# Patient Record
Sex: FEMALE | Race: WHITE | Employment: FULL TIME | URBAN - NONMETROPOLITAN AREA
[De-identification: names, ages, dates, MRNs, and addresses within clinical notes are randomized per-mention and may not be internally consistent; named-entity substitution may affect disease eponyms.]

---

## 2019-05-14 ENCOUNTER — OFFICE VISIT (OUTPATIENT)
Dept: PRIMARY CARE CLINIC | Age: 24
End: 2019-05-14
Payer: COMMERCIAL

## 2019-05-14 VITALS
BODY MASS INDEX: 27.29 KG/M2 | OXYGEN SATURATION: 98 % | DIASTOLIC BLOOD PRESSURE: 82 MMHG | TEMPERATURE: 98.5 F | WEIGHT: 139 LBS | HEART RATE: 112 BPM | SYSTOLIC BLOOD PRESSURE: 110 MMHG | HEIGHT: 60 IN

## 2019-05-14 DIAGNOSIS — K59.00 CONSTIPATION, UNSPECIFIED CONSTIPATION TYPE: ICD-10-CM

## 2019-05-14 DIAGNOSIS — R52 BODY ACHES: Primary | ICD-10-CM

## 2019-05-14 DIAGNOSIS — R68.83 CHILLS: ICD-10-CM

## 2019-05-14 DIAGNOSIS — M25.50 ARTHRALGIA, UNSPECIFIED JOINT: ICD-10-CM

## 2019-05-14 LAB — S PYO AG THROAT QL: NORMAL

## 2019-05-14 PROCEDURE — 99203 OFFICE O/P NEW LOW 30 MIN: CPT | Performed by: NURSE PRACTITIONER

## 2019-05-14 PROCEDURE — 87880 STREP A ASSAY W/OPTIC: CPT | Performed by: NURSE PRACTITIONER

## 2019-05-14 SDOH — HEALTH STABILITY: MENTAL HEALTH: HOW OFTEN DO YOU HAVE A DRINK CONTAINING ALCOHOL?: MONTHLY OR LESS

## 2019-05-14 ASSESSMENT — PATIENT HEALTH QUESTIONNAIRE - PHQ9
1. LITTLE INTEREST OR PLEASURE IN DOING THINGS: 0
SUM OF ALL RESPONSES TO PHQ9 QUESTIONS 1 & 2: 0
2. FEELING DOWN, DEPRESSED OR HOPELESS: 0
SUM OF ALL RESPONSES TO PHQ QUESTIONS 1-9: 0
SUM OF ALL RESPONSES TO PHQ QUESTIONS 1-9: 0

## 2019-05-14 ASSESSMENT — ENCOUNTER SYMPTOMS
BLOOD IN STOOL: 0
DIARRHEA: 0
CONSTIPATION: 1
COUGH: 0
ABDOMINAL PAIN: 0
EYE DISCHARGE: 0
WHEEZING: 0
EYE REDNESS: 0
RHINORRHEA: 0
TROUBLE SWALLOWING: 0
SORE THROAT: 0

## 2019-05-14 NOTE — PROGRESS NOTES
Jean [address]  [place], [address] [place] [address] Rd  Phone [phone]   Fax [phone]      OFFICE VISIT: 5/14/2019    Arnel Goodwin is a 20 y.o. female whopresents today for her medical conditions/complaints as noted below. Arnel Goodwin isc/o of Establish Care; Joint Pain; Muscle Pain (skin hurts to touch it- this has happened before but usually only last a few hours - today it has continued and also making muscles and joints hurt); and Health Maintenance (Pap Smear - has not had one since 2017 when her child was born)        HPI:      HPI  Establish Care; Joint Pain; Muscle Pain (skin hurts to touch it- this has happened before but usually only last a few hours - today it has continued and also making muscles and joints hurt); started this morning. Has felt warm and chilled. Hasn't checked her temp. Denies  symptoms. Denies URI symptoms. Has had some constipation the last few days. States she ate [employer] last night and symptoms started after that. Has some nausea, but no vomiting. No rash  No joint swelling or erythema  Denies tick bite  Denies cat scratch      Health Maintenance (Pap Smear - has not had one since 2017 when her child was born)    History reviewed. No pertinent past medical history. Past Surgical History:   Procedure Laterality Date    TUBAL LIGATION      TYMPANOSTOMY TUBE PLACEMENT Bilateral        Family History   Problem Relation Age of Onset    Cancer Mother     Mental Illness Mother     Stroke Paternal Grandmother     Other Paternal Grandfather        Social History     Tobacco Use    Smoking status: Current Every Day Smoker     Packs/day: 0.50     Years: 8.00     Pack years: 4.00     Types: Cigarettes     Start date: 2011    Smokeless tobacco: Never Used   Substance Use Topics    Alcohol use: Yes     Frequency: Monthly or less      No current outpatient medications on file. No current facility-administered medications for this visit.       No Known Allergies Health Maintenance   Topic Date Due    Varicella Vaccine (1 of 2 - 13+ 2-dose series) 06/30/2006    HPV vaccine (1 - Female 3-dose series) 06/30/2008    HIV screen  06/30/2008    DTaP/Tdap/Td vaccine (1 - Tdap) 06/30/2012    Cervical cancer screen  06/30/2014    Flu vaccine (Season Ended) 09/01/2019    Pneumococcal 0-64 years Vaccine  Aged Out        Subjective:     Review of Systems   Constitutional: Negative for appetite change and unexpected weight change. HENT: Negative for congestion, rhinorrhea, sore throat and trouble swallowing. Eyes: Negative for discharge and redness. Respiratory: Negative for cough and wheezing. Cardiovascular: Negative for chest pain. Gastrointestinal: Positive for constipation. Negative for abdominal pain, blood in stool and diarrhea. Genitourinary: Negative for dysuria. Musculoskeletal: Positive for arthralgias and myalgias. Skin: Negative for rash. Neurological: Negative for weakness. Hematological: Negative for adenopathy. Objective:      Physical Exam   Constitutional: She is oriented to person, place, and time. She appears well-developed and well-nourished. HENT:   Head: Normocephalic. Eyes: Conjunctivae are normal.   Neck: Normal range of motion. Neck supple. Cardiovascular: Normal rate and regular rhythm. Pulmonary/Chest: Effort normal.   Abdominal: Soft. Musculoskeletal: Normal range of motion. Lumbar back: She exhibits tenderness. Neurological: She is alert and oriented to person, place, and time. Skin: Skin is warm and dry. Psychiatric: Her behavior is normal.   Vitals reviewed. /82   Pulse 112   Temp 98.5 °F (36.9 °C) (Temporal)   Ht 5' (1.524 m)   Wt 139 lb (63 kg)   LMP 04/18/2019   SpO2 98%   BMI 27.15 kg/m²     Assessment:           ICD-10-CM    1. Body aches R52 POCT rapid strep A   2. Constipation, unspecified constipation type K59.00    3. Arthralgia, unspecified joint M25.50    4.  Chills

## 2019-05-14 NOTE — LETTER
[address] [address]  Phone: [phone]  Fax: [phone] DON Baca        May 14, 2019     Patient: Laura Becerra   YOB: 1995   Date of Visit: 5/14/2019       To Whom it May Concern:    Laura Becerra was seen in my clinic on 5/14/2019. She may return to work on 5/16/2019. If you have any questions or concerns, please don't hesitate to call.     Sincerely,         DON Clarke

## 2019-07-12 ENCOUNTER — OFFICE VISIT (OUTPATIENT)
Dept: PRIMARY CARE CLINIC | Age: 24
End: 2019-07-12
Payer: COMMERCIAL

## 2019-07-12 VITALS
HEART RATE: 80 BPM | SYSTOLIC BLOOD PRESSURE: 116 MMHG | HEIGHT: 60 IN | OXYGEN SATURATION: 98 % | TEMPERATURE: 98.5 F | BODY MASS INDEX: 27.96 KG/M2 | WEIGHT: 142.4 LBS | DIASTOLIC BLOOD PRESSURE: 68 MMHG

## 2019-07-12 DIAGNOSIS — Q82.9 KERATOSIS PILARIS, CONGENITAL: ICD-10-CM

## 2019-07-12 DIAGNOSIS — Z00.00 VISIT FOR PREVENTIVE HEALTH EXAMINATION: Primary | ICD-10-CM

## 2019-07-12 DIAGNOSIS — F17.219 CIGARETTE NICOTINE DEPENDENCE WITH NICOTINE-INDUCED DISORDER: ICD-10-CM

## 2019-07-12 DIAGNOSIS — K59.04 CHRONIC IDIOPATHIC CONSTIPATION: ICD-10-CM

## 2019-07-12 DIAGNOSIS — E55.9 VITAMIN D DEFICIENCY: ICD-10-CM

## 2019-07-12 PROCEDURE — 99395 PREV VISIT EST AGE 18-39: CPT | Performed by: PEDIATRICS

## 2019-07-12 ASSESSMENT — ENCOUNTER SYMPTOMS
SINUS PRESSURE: 0
DIARRHEA: 1
BLOOD IN STOOL: 0
SORE THROAT: 0
WHEEZING: 0
RHINORRHEA: 0
ANAL BLEEDING: 1
ALLERGIC/IMMUNOLOGIC NEGATIVE: 1
SHORTNESS OF BREATH: 0
PHOTOPHOBIA: 0
CONSTIPATION: 1
EYE REDNESS: 0
NAUSEA: 0
ABDOMINAL PAIN: 1
VOICE CHANGE: 0
TROUBLE SWALLOWING: 0
ABDOMINAL DISTENTION: 1
EYE PAIN: 0
VOMITING: 0
EYES NEGATIVE: 1
EYE DISCHARGE: 0
COUGH: 0
EYE ITCHING: 0

## 2019-07-19 ENCOUNTER — TELEPHONE (OUTPATIENT)
Dept: PRIMARY CARE CLINIC | Age: 24
End: 2019-07-19

## 2019-07-19 DIAGNOSIS — Z00.00 VISIT FOR PREVENTIVE HEALTH EXAMINATION: ICD-10-CM

## 2019-07-19 LAB
ALBUMIN SERPL-MCNC: 4.5 G/DL (ref 3.5–5.2)
ALP BLD-CCNC: 49 U/L (ref 35–104)
ALT SERPL-CCNC: 11 U/L (ref 5–33)
ANION GAP SERPL CALCULATED.3IONS-SCNC: 18 MMOL/L (ref 7–19)
AST SERPL-CCNC: 13 U/L (ref 5–32)
BASOPHILS ABSOLUTE: 0 K/UL (ref 0–0.2)
BASOPHILS RELATIVE PERCENT: 0.7 % (ref 0–1)
BILIRUB SERPL-MCNC: 0.4 MG/DL (ref 0.2–1.2)
BUN BLDV-MCNC: 16 MG/DL (ref 6–20)
CALCIUM SERPL-MCNC: 8.9 MG/DL (ref 8.6–10)
CHLORIDE BLD-SCNC: 103 MMOL/L (ref 98–111)
CHOLESTEROL, TOTAL: 176 MG/DL (ref 160–199)
CO2: 20 MMOL/L (ref 22–29)
CREAT SERPL-MCNC: 0.7 MG/DL (ref 0.5–0.9)
CREATININE URINE: 174.4 MG/DL (ref 4.2–622)
EOSINOPHILS ABSOLUTE: 0.2 K/UL (ref 0–0.6)
EOSINOPHILS RELATIVE PERCENT: 3.1 % (ref 0–5)
GFR NON-AFRICAN AMERICAN: >60
GLUCOSE BLD-MCNC: 92 MG/DL (ref 74–109)
HBA1C MFR BLD: 5 % (ref 4–6)
HCT VFR BLD CALC: 41.4 % (ref 37–47)
HDLC SERPL-MCNC: 51 MG/DL (ref 65–121)
HEMOGLOBIN: 13.3 G/DL (ref 12–16)
LDL CHOLESTEROL CALCULATED: 109 MG/DL
LYMPHOCYTES ABSOLUTE: 2 K/UL (ref 1.1–4.5)
LYMPHOCYTES RELATIVE PERCENT: 34.1 % (ref 20–40)
MCH RBC QN AUTO: 32 PG (ref 27–31)
MCHC RBC AUTO-ENTMCNC: 32.1 G/DL (ref 33–37)
MCV RBC AUTO: 99.8 FL (ref 81–99)
MICROALBUMIN UR-MCNC: <1.2 MG/DL (ref 0–19)
MICROALBUMIN/CREAT UR-RTO: NORMAL MG/G
MONOCYTES ABSOLUTE: 0.6 K/UL (ref 0–0.9)
MONOCYTES RELATIVE PERCENT: 10.4 % (ref 0–10)
NEUTROPHILS ABSOLUTE: 3 K/UL (ref 1.5–7.5)
NEUTROPHILS RELATIVE PERCENT: 51.4 % (ref 50–65)
PDW BLD-RTO: 11.9 % (ref 11.5–14.5)
PLATELET # BLD: 269 K/UL (ref 130–400)
PMV BLD AUTO: 10.7 FL (ref 9.4–12.3)
POTASSIUM SERPL-SCNC: 4.3 MMOL/L (ref 3.5–5)
RBC # BLD: 4.15 M/UL (ref 4.2–5.4)
SODIUM BLD-SCNC: 141 MMOL/L (ref 136–145)
T4 FREE: 1.1 NG/DL (ref 0.9–1.7)
TOTAL PROTEIN: 7.6 G/DL (ref 6.6–8.7)
TRIGL SERPL-MCNC: 78 MG/DL (ref 0–149)
TSH SERPL DL<=0.05 MIU/L-ACNC: 1.53 UIU/ML (ref 0.27–4.2)
VITAMIN D 25-HYDROXY: 22.3 NG/ML
WBC # BLD: 5.8 K/UL (ref 4.8–10.8)

## 2019-10-18 ENCOUNTER — OFFICE VISIT (OUTPATIENT)
Dept: PRIMARY CARE CLINIC | Age: 24
End: 2019-10-18
Payer: COMMERCIAL

## 2019-10-18 ENCOUNTER — HOSPITAL ENCOUNTER (OUTPATIENT)
Dept: GENERAL RADIOLOGY | Age: 24
Discharge: HOME | End: 2019-10-18
Payer: COMMERCIAL

## 2019-10-18 ENCOUNTER — TELEPHONE (OUTPATIENT)
Dept: PRIMARY CARE CLINIC | Age: 24
End: 2019-10-18

## 2019-10-18 VITALS
OXYGEN SATURATION: 99 % | WEIGHT: 145 LBS | HEART RATE: 85 BPM | HEIGHT: 60 IN | BODY MASS INDEX: 28.47 KG/M2 | SYSTOLIC BLOOD PRESSURE: 126 MMHG | DIASTOLIC BLOOD PRESSURE: 80 MMHG | TEMPERATURE: 99 F

## 2019-10-18 DIAGNOSIS — M25.561 ACUTE PAIN OF RIGHT KNEE: Primary | ICD-10-CM

## 2019-10-18 DIAGNOSIS — M25.561 ACUTE PAIN OF RIGHT KNEE: ICD-10-CM

## 2019-10-18 DIAGNOSIS — M54.31 SCIATICA OF RIGHT SIDE: ICD-10-CM

## 2019-10-18 PROCEDURE — 73562 X-RAY EXAM OF KNEE 3: CPT

## 2019-10-18 PROCEDURE — 99213 OFFICE O/P EST LOW 20 MIN: CPT | Performed by: NURSE PRACTITIONER

## 2019-10-18 RX ORDER — METHYLPREDNISOLONE 4 MG/1
TABLET ORAL
Qty: 1 KIT | Refills: 0 | Status: SHIPPED | OUTPATIENT
Start: 2019-10-18 | End: 2019-10-24

## 2019-10-18 ASSESSMENT — ENCOUNTER SYMPTOMS
RESPIRATORY NEGATIVE: 1
GASTROINTESTINAL NEGATIVE: 1

## 2020-05-12 ENCOUNTER — VIRTUAL VISIT (OUTPATIENT)
Dept: PRIMARY CARE CLINIC | Age: 25
End: 2020-05-12
Payer: COMMERCIAL

## 2020-05-12 ENCOUNTER — NURSE TRIAGE (OUTPATIENT)
Dept: OTHER | Facility: CLINIC | Age: 25
End: 2020-05-12

## 2020-05-12 PROCEDURE — 99213 OFFICE O/P EST LOW 20 MIN: CPT | Performed by: NURSE PRACTITIONER

## 2020-05-12 RX ORDER — HYDROXYZINE PAMOATE 25 MG/1
25 CAPSULE ORAL 3 TIMES DAILY PRN
Qty: 30 CAPSULE | Refills: 0 | Status: SHIPPED | OUTPATIENT
Start: 2020-05-12 | End: 2020-06-26

## 2020-05-12 RX ORDER — BUSPIRONE HYDROCHLORIDE 5 MG/1
5 TABLET ORAL 2 TIMES DAILY
Qty: 60 TABLET | Refills: 0 | Status: SHIPPED | OUTPATIENT
Start: 2020-05-12 | End: 2020-05-22 | Stop reason: SDUPTHER

## 2020-05-12 RX ORDER — SERTRALINE HYDROCHLORIDE 25 MG/1
25 TABLET, FILM COATED ORAL DAILY
Qty: 30 TABLET | Refills: 3 | Status: SHIPPED | OUTPATIENT
Start: 2020-05-12 | End: 2020-05-22 | Stop reason: SDUPTHER

## 2020-05-12 ASSESSMENT — ENCOUNTER SYMPTOMS: TROUBLE SWALLOWING: 0

## 2020-05-12 NOTE — TELEPHONE ENCOUNTER
Received call from Darlene in [facility]. Patient is having anxiety or whatever- in her words. She needs something to calm her down so she can go to work- in her words. She went to [place] on Saturday and she is audibly upset about people not following wearing masks rules and social distancing. SOB with anxiety    Patient is supposed to return to work today but called in sick. Protocol recommendation shared and care advice shared. Call soft transferred to Samantha Tadeo in [facility] to schedule appointment to schedule a video chat. .    Please do not reply to the triage nurse through this encounter. Any subsequent communication should be directly with the patient.     Reason for Disposition   Patient sounds very upset or troubled to the triager    Protocols used: ANXIETY AND PANIC ATTACK-ADULT-OH

## 2020-05-12 NOTE — PROGRESS NOTES
Jean 23  [place], [address]  Phone [phone]   Fax [phone]            TELEMEDICINE visit by doxy me    OFFICE VISIT: 2020    Mony Kidd- : 1995      Reason For Visit:  Jeff Webb is a 30 y.o. Mac Douglas is here for Anxiety (and panic attacks)         Health Maintenance         HPI     Patient is for issue of anxiety and panic attacks  Reports Saturday went to 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 There is no height or weight on file to calculate BMI.     Results for orders placed or performed in visit on 19   CBC Auto Differential   Result Value Ref Range    WBC 5.8 4.8 - 10.8 K/uL    RBC 4.15 (L) 4.20 - 5.40 M/uL    Hemoglobin 13.3 12.0 - 16.0 g/dL    Hematocrit 41.4 37.0 - 47.0 %    MCV 99.8 (H) 81.0 - 99.0 fL    MCH 32.0 (H) 27.0 - 31.0 pg    MCHC 32.1 (L) 33.0 - 37.0 g/dL    RDW 11.9 11.5 - 14.5 %    Platelets 914 061 - 899 K/uL    MPV 10.7 9.4 - 12.3 fL    Neutrophils % 51.4 50.0 - 65.0 %    Lymphocytes % 34.1 20.0 - 40.0 %    Monocytes % 10.4 (H) 0.0 - 10.0 %    Eosinophils % 3.1 0.0 - 5.0 %    Basophils % 0.7 0.0 - 1.0 %    Neutrophils Absolute 3.0 1.5 - 7.5 K/uL    Lymphocytes Absolute 2.0 1.1 - 4.5 K/uL    Monocytes Absolute 0.60 0.00 - 0.90 K/uL    Eosinophils Absolute 0.20 0.00 - 0.60 K/uL    Basophils Absolute 0.00 0.00 - 0.20 K/uL   Comprehensive Metabolic Panel   Result Value Ref Range    Sodium 141 136 - 145 mmol/L    Potassium 4.3 3.5 - 5.0 mmol/L    Chloride 103 98 - 111 mmol/L    CO2 20 (L) 22 - 29 mmol/L    Anion Gap 18 7 - 19 mmol/L    Glucose 92 74 - 109 mg/dL    BUN 16 6 - 20 mg/dL    CREATININE 0.7 0.5 - 0.9 mg/dL    GFR Non-African American >60 >60    Calcium 8.9  CREATININE 07/19/2019 0.7     GFR Non- 07/19/2019 >60     Calcium 07/19/2019 8.9     Total Protein 07/19/2019 7.6     Alb 07/19/2019 4.5     Total Bilirubin 07/19/2019 0.4     Alkaline Phosphatase 07/19/2019 49     ALT 07/19/2019 11     AST 07/19/2019 13     Microalbumin, Random Uri* 07/19/2019 <1.20     Creatinine, Ur 07/19/2019 174.4     Microalbumin Creatinine * 07/19/2019 see below     T4 Free 07/19/2019 1.1     TSH 07/19/2019 1.530     Cholesterol, Total 07/19/2019 176     Triglycerides 07/19/2019 78     HDL 07/19/2019 51*    LDL Calculated 07/19/2019 109     Hemoglobin A1C 07/19/2019 5.0     Vit D, 25-Hydroxy 07/19/2019 22.3*     Copies of these are in the chart. Prior to Visit Medications    Medication Sig Taking? Authorizing Provider   hydrOXYzine (VISTARIL) 25 MG capsule Take 1 capsule by mouth 3 times daily as needed for Anxiety Yes Rosanky Favorite, APRN   sertraline (ZOLOFT) 25 MG tablet Take 1 tablet by mouth daily Yes Rosanky Favorite, APRN   busPIRone (BUSPAR) 5 MG tablet Take 1 tablet by mouth 2 times daily Yes Rosanky Favorite, APRN   linaclotide [place]) 145 MCG capsule Take 1 capsule by mouth every morning (before breakfast)  B Kayden Mock DO   nicotine polacrilex (NICORETTE) 2 MG gum Take 1 each by mouth as needed for Smoking cessation  B Kayden Mock DO       Allergies: Patient has no known allergies. No past medical history on file. Past Surgical History:   Procedure Laterality Date    TUBAL LIGATION      TYMPANOSTOMY TUBE PLACEMENT Bilateral        Social History     Tobacco Use    Smoking status: Current Every Day Smoker     Packs/day: 0.50     Years: 8.00     Pack years: 4.00     Types: Cigarettes     Start date: 2011    Smokeless tobacco: Never Used   Substance Use Topics    Alcohol use: Yes     Frequency: Monthly or less       Review of Systems   Constitutional: Positive for activity change.  Negative for appetite change, fatigue and fever. HENT: Negative for congestion, postnasal drip and trouble swallowing. Genitourinary: Negative for difficulty urinating. Skin: Negative for rash. Neurological: Negative for dizziness. Psychiatric/Behavioral: Positive for agitation (with ocd things) and sleep disturbance. Negative for self-injury and suicidal ideas. The patient is nervous/anxious. The patient is not hyperactive. Physical Exam  Constitutional:       Appearance: Normal appearance. Comments: Video     Cardiovascular:      Rate and Rhythm: Normal rate and regular rhythm. Heart sounds: No murmur. Neurological:      Mental Status: She is alert and oriented to person, place, and time. Psychiatric:         Mood and Affect: Mood normal.         Behavior: Behavior normal.      Comments: Calm on video           ASSESSMENT/ Maya Niki is  being evaluated by a Virtual Visit (video visit) encounter to address concerns as mentioned above. A caregiver was present when appropriate. Due to this being a TeleHealth encounter (During [facility]-18 public health emergency), evaluation of the following organ systems was limited: Vitals/Constitutional/EENT/Resp/CV/GI//MS/Neuro/Skin/Heme-Lymph-Imm. Pursuant to the emergency declaration under the [address] [place] [address] authority and the [employer] Act, this Virtual Visit was conducted with patient's (and/or legal guardian's) consent, to reduce the patient's risk of exposure to COVID-19 and provide necessary medical care. The patient (and/or legal guardian) has also been advised to contact this office for worsening conditions or problems, and seek emergency medical treatment and/or call 911 if deemed necessary. Services were provided through a video synchronous discussion virtually to substitute for in-person clinic visit.  Patient and provider were located at their

## 2020-05-22 ENCOUNTER — VIRTUAL VISIT (OUTPATIENT)
Dept: PRIMARY CARE CLINIC | Age: 25
End: 2020-05-22
Payer: COMMERCIAL

## 2020-05-22 PROCEDURE — 99213 OFFICE O/P EST LOW 20 MIN: CPT | Performed by: NURSE PRACTITIONER

## 2020-05-22 RX ORDER — BUSPIRONE HYDROCHLORIDE 5 MG/1
5 TABLET ORAL 2 TIMES DAILY
Qty: 60 TABLET | Refills: 0 | Status: SHIPPED | OUTPATIENT
Start: 2020-05-22 | End: 2020-06-21

## 2020-05-22 ASSESSMENT — ENCOUNTER SYMPTOMS
COUGH: 0
BACK PAIN: 0
SHORTNESS OF BREATH: 0
WHEEZING: 0
SORE THROAT: 0
TROUBLE SWALLOWING: 0
EYES NEGATIVE: 1

## 2020-05-22 NOTE — PATIENT INSTRUCTIONS
Patient Education        Anxiety Disorder: Care Instructions  Your Care Instructions    Anxiety is a normal reaction to stress. Difficult situations can cause you to have symptoms such as sweaty palms and a nervous feeling. In an anxiety disorder, the symptoms are far more severe. Constant worry, muscle tension, trouble sleeping, nausea and diarrhea, and other symptoms can make normal daily activities difficult or impossible. These symptoms may occur for no reason, and they can affect your work, school, or social life. Medicines, counseling, and self-care can all help. Follow-up care is a key part of your treatment and safety. Be sure to make and go to all appointments, and call your doctor if you are having problems. It's also a good idea to know your test results and keep a list of the medicines you take. How can you care for yourself at home? · Take medicines exactly as directed. Call your doctor if you think you are having a problem with your medicine. · Go to your counseling sessions and follow-up appointments. · Recognize and accept your anxiety. Then, when you are in a situation that makes you anxious, say to yourself, \"This is not an emergency. I feel uncomfortable, but I am not in danger. I can keep going even if I feel anxious. \"  · Be kind to your body:  ? Relieve tension with exercise or a massage. ? Get enough rest.  ? Avoid alcohol, caffeine, nicotine, and illegal drugs. They can increase your anxiety level and cause sleep problems. ? Learn and do relaxation techniques. See below for more about these techniques. · Engage your mind. Get out and do something you enjoy. Go to a funny movie, or take a walk or hike. Plan your day. Having too much or too little to do can make you anxious. · Keep a record of your symptoms. Discuss your fears with a good friend or family member, or join a support group for people with similar problems. Talking to others sometimes relieves stress.   · Get involved in play a relaxation tape while you drive a car. When should you call for help? Call 911 anytime you think you may need emergency care. For example, call if:    · You feel you cannot stop from hurting yourself or someone else.   Ariella Lawrence the numbers for these national suicide hotlines: 7-003-187-TALK (5-685.318.8522) and 6-959-OWBAPCZ (6-640.290.9389). If you or someone you know talks about suicide or feeling hopeless, get help right away.   Watch closely for changes in your health, and be sure to contact your doctor if:    · You have anxiety or fear that affects your life.     · You have symptoms of anxiety that are new or different from those you had before. Where can you learn more? Go to [email or web address] and sign in to your [employer] account. Enter P754 in the [employer] box to learn more about \"Anxiety Disorder: Care Instructions. \"     If you do not have an account, please click on the \"Sign Up Now\" link. Current as of: May 28, 2019Content Version: 12.4  © 3655-9261 Healthwise, Incorporated. Care instructions adapted under license by [facility] If you have questions about a medical condition or this instruction, always ask your healthcare professional. [address] any warranty or liability for your use of this information.

## 2020-05-22 NOTE — PROGRESS NOTES
Jean [address]  [place], [address]  Phone [phone]   Fax [phone]            TELEMEDICINE visit by doxy me     OFFICE VISIT: 2020    David Souza- : 1995      Reason For Visit:  JADE is a 30 y.o. Camila Lan is here for Follow-up (panic follow up)         Health Maintenance    HPI      Patient is 1 week follow up on anxiety and panic attacks   OCD and just gets upset    She has been working  Still some panic at work  But goes outside takes mask off and breath and go back to work    She has started the zoloft  Denies any side effects    She is taking the buspar as needed  She took [employer] and able to get through   She has been taking it daily and then the second if needed         vitals were not taken for this visit. There is no height or weight on file to calculate BMI.     Results for orders placed or performed in visit on 19   CBC Auto Differential   Result Value Ref Range    WBC 5.8 4.8 - 10.8 K/uL    RBC 4.15 (L) 4.20 - 5.40 M/uL    Hemoglobin 13.3 12.0 - 16.0 g/dL    Hematocrit 41.4 37.0 - 47.0 %    MCV 99.8 (H) 81.0 - 99.0 fL    MCH 32.0 (H) 27.0 - 31.0 pg    MCHC 32.1 (L) 33.0 - 37.0 g/dL    RDW 11.9 11.5 - 14.5 %    Platelets 998 008 - 594 K/uL    MPV 10.7 9.4 - 12.3 fL    Neutrophils % 51.4 50.0 - 65.0 %    Lymphocytes % 34.1 20.0 - 40.0 %    Monocytes % 10.4 (H) 0.0 - 10.0 %    Eosinophils % 3.1 0.0 - 5.0 %    Basophils % 0.7 0.0 - 1.0 %    Neutrophils Absolute 3.0 1.5 - 7.5 K/uL    Lymphocytes Absolute 2.0 1.1 - 4.5 K/uL    Monocytes Absolute 0.60 0.00 - 0.90 K/uL    Eosinophils Absolute 0.20 0.00 - 0.60 K/uL    Basophils Absolute 0.00 0.00 - 0.20 K/uL   Comprehensive Metabolic Panel   Result Value Ref Range    Sodium 141 136 - 145 mmol/L    Potassium 4.3 3.5 - 5.0 mmol/L    Chloride 103 98 - 111 mmol/L    CO2 20 (L) 22 - 29 mmol/L    Anion Gap 18 7 - 19 mmol/L    Glucose 92 74 - 109 mg/dL    BUN 16 6 - 20 mg/dL    CREATININE 0.7 0.5 - 0.9 mg/dL    GFR Non- 07/19/2019 92     BUN 07/19/2019 16     CREATININE 07/19/2019 0.7     GFR Non- 07/19/2019 >60     Calcium 07/19/2019 8.9     Total Protein 07/19/2019 7.6     Alb 07/19/2019 4.5     Total Bilirubin 07/19/2019 0.4     Alkaline Phosphatase 07/19/2019 49     ALT 07/19/2019 11     AST 07/19/2019 13     Microalbumin, Random Uri* 07/19/2019 <1.20     Creatinine, Ur 07/19/2019 174.4     Microalbumin Creatinine * 07/19/2019 see below     T4 Free 07/19/2019 1.1     TSH 07/19/2019 1.530     Cholesterol, Total 07/19/2019 176     Triglycerides 07/19/2019 78     HDL 07/19/2019 51*    LDL Calculated 07/19/2019 109     Hemoglobin A1C 07/19/2019 5.0     Vit D, 25-Hydroxy 07/19/2019 22.3*     Copies of these are in the chart. Prior to Visit Medications    Medication Sig Taking? Authorizing Provider   sertraline (ZOLOFT) 50 MG tablet Take 1 tablet by mouth daily Yes DON Gonsalves   busPIRone (BUSPAR) 5 MG tablet Take 1 tablet by mouth 2 times daily Yes DON Gonsalves   hydrOXYzine (VISTARIL) 25 MG capsule Take 1 capsule by mouth 3 times daily as needed for Anxiety  DON Gonsalves   linaclotide Flonnie Rival) 145 MCG capsule Take 1 capsule by mouth every morning (before breakfast)  B Margaret Neves DO   nicotine polacrilex (NICORETTE) 2 MG gum Take 1 each by mouth as needed for Smoking cessation  B Margaret Neves DO       Allergies: Patient has no known allergies. No past medical history on file.     Past Surgical History:   Procedure Laterality Date    TUBAL LIGATION      TYMPANOSTOMY TUBE PLACEMENT Bilateral        Social History     Tobacco Use    Smoking status: Current Every Day Smoker     Packs/day: 0.50     Years: 8.00     Pack years: 4.00     Types: Cigarettes     Start date: 2011    Smokeless tobacco: Never Used   Substance Use Topics    Alcohol use: Yes     Frequency: Monthly or less       Review of Systems   Constitutional: Positive for activity change. Negative for appetite change, fatigue and fever. HENT: Negative for congestion, postnasal drip, sore throat and trouble swallowing. Eyes: Negative. Respiratory: Negative for cough, shortness of breath and wheezing. Cardiovascular: Negative for chest pain, palpitations and leg swelling. Musculoskeletal: Negative for arthralgias and back pain. Skin: Negative for rash. Psychiatric/Behavioral: Negative for agitation, self-injury, sleep disturbance and suicidal ideas. The patient is nervous/anxious (improving some). The patient is not hyperactive. Physical Exam  Constitutional:       Appearance: Normal appearance. Comments: Video     Neurological:      Mental Status: She is alert. ASSESSMENT/ Mainor Yandel is  being evaluated by a Virtual Visit (video visit) encounter to address concerns as mentioned above. A caregiver was present when appropriate. Due to this being a TeleHealth encounter (During [facility] public health emergency), evaluation of the following organ systems was limited: Vitals/Constitutional/EENT/Resp/CV/GI//MS/Neuro/Skin/Heme-Lymph-Imm. Pursuant to the emergency declaration under the [facility], [address] [place] [address] authority and the [employer] Act, this Virtual Visit was conducted with patient's (and/or legal guardian's) consent, to reduce the patient's risk of exposure to COVID-19 and provide necessary medical care. The patient (and/or legal guardian) has also been advised to contact this office for worsening conditions or problems, and seek emergency medical treatment and/or call 911 if deemed necessary. Services were provided through a video synchronous discussion virtually to substitute for in-person clinic visit. Patient and provider were located at their individual homes.      1. Panic disorder  Cont buspar daily and as needed  - sertraline (ZOLOFT) 50 MG

## 2020-06-26 ENCOUNTER — VIRTUAL VISIT (OUTPATIENT)
Dept: PRIMARY CARE CLINIC | Age: 25
End: 2020-06-26
Payer: COMMERCIAL

## 2020-06-26 PROCEDURE — 99442 PR PHYS/QHP TELEPHONE EVALUATION 11-20 MIN: CPT | Performed by: NURSE PRACTITIONER

## 2020-06-26 RX ORDER — PROPRANOLOL HYDROCHLORIDE 40 MG/1
40 TABLET ORAL 2 TIMES DAILY
Qty: 60 TABLET | Refills: 5 | Status: SHIPPED | OUTPATIENT
Start: 2020-06-26 | End: 2022-01-14

## 2020-06-26 RX ORDER — BUSPIRONE HYDROCHLORIDE 5 MG/1
5 TABLET ORAL 2 TIMES DAILY
Qty: 60 TABLET | Refills: 5 | Status: SHIPPED | OUTPATIENT
Start: 2020-06-26 | End: 2020-07-26

## 2020-06-26 RX ORDER — HYDROXYZINE PAMOATE 25 MG/1
25 CAPSULE ORAL 3 TIMES DAILY PRN
Qty: 30 CAPSULE | Refills: 5 | Status: SHIPPED | OUTPATIENT
Start: 2020-06-26 | End: 2022-01-14

## 2020-06-26 ASSESSMENT — PATIENT HEALTH QUESTIONNAIRE - PHQ9
SUM OF ALL RESPONSES TO PHQ9 QUESTIONS 1 & 2: 0
2. FEELING DOWN, DEPRESSED OR HOPELESS: 0
1. LITTLE INTEREST OR PLEASURE IN DOING THINGS: 0
SUM OF ALL RESPONSES TO PHQ QUESTIONS 1-9: 0
SUM OF ALL RESPONSES TO PHQ QUESTIONS 1-9: 0

## 2020-06-26 ASSESSMENT — ENCOUNTER SYMPTOMS
SHORTNESS OF BREATH: 0
BACK PAIN: 0
COUGH: 0
TROUBLE SWALLOWING: 0
WHEEZING: 0
EYES NEGATIVE: 1
SORE THROAT: 0

## 2020-06-26 NOTE — PROGRESS NOTES
Jean [address]  [place], [address]  Phone [phone]   Fax [phone]            TELEMEDICINE visit by telephone doxy me not working    OFFICE VISIT: 2020    Leda Pickett- : 1995      Reason For Visit:  Neelima Roper is a 30 y.o. Jerrod Fortune is here for Anxiety (OCD)         Health Maintenance     HPI    Patient is on telephone visit as doxy didn't work    She reports she tried to go zoloft 50mg headache   And so tried to decreased 25mg still headache   So she quit    She has the busbar and vistaril as needed  She is still feeling anxious at times and OCD takes over    She went to [employer] since talking last and \"it was bad\"  \"to many people for me\"         vitals were not taken for this visit. There is no height or weight on file to calculate BMI.     Results for orders placed or performed in visit on 19   CBC Auto Differential   Result Value Ref Range    WBC 5.8 4.8 - 10.8 K/uL    RBC 4.15 (L) 4.20 - 5.40 M/uL    Hemoglobin 13.3 12.0 - 16.0 g/dL    Hematocrit 41.4 37.0 - 47.0 %    MCV 99.8 (H) 81.0 - 99.0 fL    MCH 32.0 (H) 27.0 - 31.0 pg    MCHC 32.1 (L) 33.0 - 37.0 g/dL    RDW 11.9 11.5 - 14.5 %    Platelets 832 459 - 823 K/uL    MPV 10.7 9.4 - 12.3 fL    Neutrophils % 51.4 50.0 - 65.0 %    Lymphocytes % 34.1 20.0 - 40.0 %    Monocytes % 10.4 (H) 0.0 - 10.0 %    Eosinophils % 3.1 0.0 - 5.0 %    Basophils % 0.7 0.0 - 1.0 %    Neutrophils Absolute 3.0 1.5 - 7.5 K/uL    Lymphocytes Absolute 2.0 1.1 - 4.5 K/uL    Monocytes Absolute 0.60 0.00 - 0.90 K/uL    Eosinophils Absolute 0.20 0.00 - 0.60 K/uL    Basophils Absolute 0.00 0.00 - 0.20 K/uL   Comprehensive Metabolic Panel   Result Value Ref Range    Sodium 141 136 - 145 mmol/L    Potassium 4.3 3.5 - 5.0 mmol/L    Chloride 103 98 - 111 mmol/L    CO2 20 (L) 22 - 29 mmol/L    Anion Gap 18 7 - 19 mmol/L    Glucose 92 74 - 109 mg/dL    BUN 16 6 - 20 mg/dL    CREATININE 0.7 0.5 - 0.9 mg/dL    GFR Non-African American >60 >60    Calcium 8.9 8.6 - 10.0 mg/dL    Total Protein 7.6 6.6 - 8.7 g/dL    Alb 4.5 3.5 - 5.2 g/dL    Total Bilirubin 0.4 0.2 - 1.2 mg/dL    Alkaline Phosphatase 49 35 - 104 U/L    ALT 11 5 - 33 U/L    AST 13 5 - 32 U/L   Microalbumin / Creatinine Urine Ratio   Result Value Ref Range    Microalbumin, Random Urine <1.20 0.00 - 19.00 mg/dL    Creatinine, Ur 174.4 4.2 - 622.0 mg/dL    Microalbumin Creatinine Ratio see below mg/g   T4, Free   Result Value Ref Range    T4 Free 1.1 0.9 - 1.7 ng/dL   TSH without Reflex   Result Value Ref Range    TSH 1.530 0.270 - 4.200 uIU/mL   Lipid Panel   Result Value Ref Range    Cholesterol, Total 176 160 - 199 mg/dL    Triglycerides 78 0 - 149 mg/dL    HDL 51 (L) 65 - 121 mg/dL    LDL Calculated 109 <100 mg/dL   Hemoglobin A1C   Result Value Ref Range    Hemoglobin A1C 5.0 4.0 - 6.0 %   Vitamin D 25 Hydroxy   Result Value Ref Range    Vit D, 25-Hydroxy 22.3 (L) >=30 ng/mL       I have reviewed the following with the [id number]   Lab Review   No visits with results within 6 Month(s) from this visit.    Latest known visit with results is:   Orders Only on 07/19/2019   Component Date Value    WBC 07/19/2019 5.8     RBC 07/19/2019 4.15*    Hemoglobin 07/19/2019 13.3     Hematocrit 07/19/2019 41.4     MCV 07/19/2019 99.8*    MCH 07/19/2019 32.0*    MCHC 07/19/2019 32.1*    RDW 07/19/2019 11.9     Platelets 63/58/1287 269     MPV 07/19/2019 10.7     Neutrophils % 07/19/2019 51.4     Lymphocytes % 07/19/2019 34.1     Monocytes % 07/19/2019 10.4*    Eosinophils % 07/19/2019 3.1     Basophils % 07/19/2019 0.7     Neutrophils Absolute 07/19/2019 3.0     Lymphocytes Absolute 07/19/2019 2.0     Monocytes Absolute 07/19/2019 0.60     Eosinophils Absolute 07/19/2019 0.20     Basophils Absolute 07/19/2019 0.00     Sodium 07/19/2019 141     Potassium 07/19/2019 4.3     Chloride 07/19/2019 103     CO2 07/19/2019 20*    Anion Gap 07/19/2019 18     Glucose 07/19/2019 92     BUN 07/19/2019 16     Negative for appetite change, fatigue and fever. HENT: Negative for congestion, postnasal drip, sore throat and trouble swallowing. Eyes: Negative. Respiratory: Negative for cough, shortness of breath and wheezing. Cardiovascular: Negative for chest pain, palpitations and leg swelling. Musculoskeletal: Negative for arthralgias and back pain. Skin: Negative for rash. Psychiatric/Behavioral: Negative for agitation, self-injury, sleep disturbance and suicidal ideas. The patient is nervous/anxious. The patient is not hyperactive. Physical Exam  Constitutional:       Comments: Telephone     Cardiovascular:      Comments: Denies    Neurological:      Mental Status: She is oriented to person, place, and time. Psychiatric:         Mood and Affect: Mood normal.         Behavior: Behavior normal.         ASSESSMENT/ PLAN    Shreya Ortiz is  being evaluated by a telephone encounter 13 minutes to address concerns as mentioned above. A caregiver was present when appropriate. Due to this being a TeleHealth encounter (During LWOJM-43 public health emergency), evaluation of the following organ systems was limited: Vitals/Constitutional/EENT/Resp/CV/GI//MS/Neuro/Skin/Heme-Lymph-Imm. Pursuant to the emergency declaration under the [facility], [address] [place] [address] authority and the [employer] [facility] and [employer] Act, this Virtual Visit was conducted with patient's (and/or legal guardian's) consent, to reduce the patient's risk of exposure to COVID-19 and provide necessary medical care. The patient (and/or legal guardian) has also been advised to contact this office for worsening conditions or problems, and seek emergency medical treatment and/or call 911 if deemed necessary. Services were provided through a video synchronous discussion virtually to substitute for in-person clinic visit.  Patient and provider were located at their individual answered. Pt voiced understanding.      Wilfrid Brunner, APRN

## 2020-08-14 ENCOUNTER — VIRTUAL VISIT (OUTPATIENT)
Dept: PRIMARY CARE CLINIC | Age: 25
End: 2020-08-14
Payer: COMMERCIAL

## 2020-08-14 PROCEDURE — 99406 BEHAV CHNG SMOKING 3-10 MIN: CPT | Performed by: NURSE PRACTITIONER

## 2020-08-14 PROCEDURE — 99214 OFFICE O/P EST MOD 30 MIN: CPT | Performed by: NURSE PRACTITIONER

## 2020-08-14 RX ORDER — NICOTINE 21 MG/24HR
1 PATCH, TRANSDERMAL 24 HOURS TRANSDERMAL DAILY
Qty: 42 PATCH | Refills: 0 | Status: SHIPPED | OUTPATIENT
Start: 2020-08-14 | End: 2022-01-14

## 2020-08-14 ASSESSMENT — ENCOUNTER SYMPTOMS
SORE THROAT: 0
EYES NEGATIVE: 1
TROUBLE SWALLOWING: 0
WHEEZING: 0
BACK PAIN: 0
SHORTNESS OF BREATH: 0
COUGH: 0

## 2020-08-14 NOTE — PROGRESS NOTES
0. 00 0.00 - 0.20 K/uL   Comprehensive Metabolic Panel   Result Value Ref Range    Sodium 141 136 - 145 mmol/L    Potassium 4.3 3.5 - 5.0 mmol/L    Chloride 103 98 - 111 mmol/L    CO2 20 (L) 22 - 29 mmol/L    Anion Gap 18 7 - 19 mmol/L    Glucose 92 74 - 109 mg/dL    BUN 16 6 - 20 mg/dL    CREATININE 0.7 0.5 - 0.9 mg/dL    GFR Non-African American >60 >60    Calcium 8.9 8.6 - 10.0 mg/dL    Total Protein 7.6 6.6 - 8.7 g/dL    Alb 4.5 3.5 - 5.2 g/dL    Total Bilirubin 0.4 0.2 - 1.2 mg/dL    Alkaline Phosphatase 49 35 - 104 U/L    ALT 11 5 - 33 U/L    AST 13 5 - 32 U/L   Microalbumin / Creatinine Urine Ratio   Result Value Ref Range    Microalbumin, Random Urine <1.20 0.00 - 19.00 mg/dL    Creatinine, Ur 174.4 4.2 - 622.0 mg/dL    Microalbumin Creatinine Ratio see below mg/g   T4, Free   Result Value Ref Range    T4 Free 1.1 0.9 - 1.7 ng/dL   TSH without Reflex   Result Value Ref Range    TSH 1.530 0.270 - 4.200 uIU/mL   Lipid Panel   Result Value Ref Range    Cholesterol, Total 176 160 - 199 mg/dL    Triglycerides 78 0 - 149 mg/dL    HDL 51 (L) 65 - 121 mg/dL    LDL Calculated 109 <100 mg/dL   Hemoglobin A1C   Result Value Ref Range    Hemoglobin A1C 5.0 4.0 - 6.0 %   Vitamin D 25 Hydroxy   Result Value Ref Range    Vit D, 25-Hydroxy 22.3 (L) >=30 ng/mL       I have reviewed the following with the [facility] [address]   Lab Review   No visits with results within 6 Month(s) from this visit.    Latest known visit with results is:   Orders Only on 07/19/2019   Component Date Value    WBC 07/19/2019 5.8     RBC 07/19/2019 4.15*    Hemoglobin 07/19/2019 13.3     Hematocrit 07/19/2019 41.4     MCV 07/19/2019 99.8*    MCH 07/19/2019 32.0*    MCHC 07/19/2019 32.1*    RDW 07/19/2019 11.9     Platelets 19/89/7059 269     MPV 07/19/2019 10.7     Neutrophils % 07/19/2019 51.4     Lymphocytes % 07/19/2019 34.1     Monocytes % 07/19/2019 10.4*    Eosinophils % 07/19/2019 3.1     Basophils % 07/19/2019 0.7     Neutrophils Absolute 07/19/2019 3.0     Lymphocytes Absolute 07/19/2019 2.0     Monocytes Absolute 07/19/2019 0.60     Eosinophils Absolute 07/19/2019 0.20     Basophils Absolute 07/19/2019 0.00     Sodium 07/19/2019 141     Potassium 07/19/2019 4.3     Chloride 07/19/2019 103     CO2 07/19/2019 20*    Anion Gap 07/19/2019 18     Glucose 07/19/2019 92     BUN 07/19/2019 16     CREATININE 07/19/2019 0.7     GFR Non- 07/19/2019 >60     Calcium 07/19/2019 8.9     Total Protein 07/19/2019 7.6     Alb 07/19/2019 4.5     Total Bilirubin 07/19/2019 0.4     Alkaline Phosphatase 07/19/2019 49     ALT 07/19/2019 11     AST 07/19/2019 13     Microalbumin, Random Uri* 07/19/2019 <1.20     Creatinine, Ur 07/19/2019 174.4     Microalbumin Creatinine * 07/19/2019 see below     T4 Free 07/19/2019 1.1     TSH 07/19/2019 1.530     Cholesterol, Total 07/19/2019 176     Triglycerides 07/19/2019 78     HDL 07/19/2019 51*    LDL Calculated 07/19/2019 109     Hemoglobin A1C 07/19/2019 5.0     Vit D, 25-Hydroxy 07/19/2019 22.3*     Copies of these are in the chart. Prior to Visit Medications    Medication Sig Taking? Authorizing Provider   nicotine (NICODERM CQ) 21 MG/24HR Place 1 patch onto the skin daily Yes DON Gil   propranolol (INDERAL) 40 MG tablet Take 1 tablet by mouth 2 times daily  DON Gil   hydrOXYzine (VISTARIL) 25 MG capsule Take 1 capsule by mouth 3 times daily as needed for Anxiety  ODN Gil   linaclotide Armenian Risser) 145 MCG capsule Take 1 capsule by mouth every morning (before breakfast)  B Eddy Andrews DO   nicotine polacrilex (NICORETTE) 2 MG gum Take 1 each by mouth as needed for Smoking cessation  B Eddy Andrews DO       Allergies: Patient has no known allergies. No past medical history on file.     Past Surgical History:   Procedure Laterality Date    TUBAL LIGATION      TYMPANOSTOMY TUBE PLACEMENT Bilateral        Social History     Tobacco Use    Smoking status: Current Every Day Smoker     Packs/day: 0.50     Years: 8.00     Pack years: 4.00     Types: Cigarettes     Start date: 2011    Smokeless tobacco: Never Used   Substance Use Topics    Alcohol use: Yes     Frequency: Monthly or less       Review of Systems   Constitutional: Positive for activity change. Negative for appetite change, fatigue and fever. HENT: Negative for congestion, postnasal drip, sore throat and trouble swallowing. Eyes: Negative. Respiratory: Negative for cough, shortness of breath and wheezing. Cardiovascular: Negative for chest pain, palpitations and leg swelling. Musculoskeletal: Negative for arthralgias and back pain. Skin: Negative for rash. Neurological: Negative for dizziness and headaches. Psychiatric/Behavioral: Negative for agitation, self-injury, sleep disturbance and suicidal ideas. The patient is nervous/anxious. The patient is not hyperactive. Physical Exam  Constitutional:       Comments: Telephone     Cardiovascular:      Comments: Denies    Neurological:      Mental Status: She is oriented to person, place, and time. Psychiatric:         Mood and Affect: Mood normal.         Behavior: Behavior normal.         ASSESSMENT/ PLAN    Luis Manuel Staley is  being evaluated by a Virtual Visit (video visit) encounter to address concerns as mentioned above. A caregiver was present when appropriate. Due to this being a TeleHealth encounter (During [facility]- public health emergency), evaluation of the following organ systems was limited: Vitals/Constitutional/EENT/Resp/CV/GI//MS/Neuro/Skin/Heme-Lymph-Imm.   Pursuant to the emergency declaration under the [facility], [address] [place] [address] and the [employer] DEL[employer] and [employer] Act, this Virtual Visit was conducted with patient's (and/or legal guardian's) consent, to reduce the patient's risk of exposure to with the same concerns share their feelings and give each other support. You also may have family therapy. Your loved ones can learn more about how to help you. Your doctor also may prescribe medicine, such as an antidepressant, to help with symptoms. Follow-up care is a key part of your treatment and safety. Be sure to make and go to all appointments, and call your doctor if you are having problems. It's also a good idea to know your test results and keep a list of the medicines you take. How can you care for yourself at home? · Take your medicines exactly as prescribed. Call your doctor if you think you are having a problem with your medicine. You will get more details on the specific medicines your doctor prescribes. · Go to your counseling sessions and follow-up appointments. · Do any homework or exercises that your therapist gives you to do at home. · Involve family members and loved ones in your treatment, especially if your doctor suggests you go to therapy together. · Try to reduce stress. This can help you cope. Some ways to do this include:  ? Taking slow, deep breaths. ? Soaking in a warm bath. ? Listening to soothing music. ? Taking a walk or doing some other exercise. ? Taking a yoga class. ? Having a massage or back rub. ? Drinking a warm, nonalcoholic, noncaffeinated beverage. · Eating a healthy, balanced diet and avoiding certain foods or drinks may also help you reduce stress. ? Avoid or limit caffeine. Coffee, tea, some soda pops, and chocolate contain caffeine. If you drink a lot of caffeine, reduce the amount gradually. Suddenly stopping use of caffeine can cause headaches and make it hard for you to concentrate. ? Limit alcohol to 2 drinks a day for men and 1 drink a day for women. Too much alcohol can cause health problems. ? Make mealtimes calm and relaxed. Try not to skip meals or eat on the run.  Skipping meals can make other stress-related symptoms worse, such as headaches following healthy behaviors: none    Patient giveneducational materials on plan of care    I have instructed Chelly to complete a self tracking handout on blood pressure and headache log and instructed them to bring it with them to her next appointment. Discussed use, benefit, and side effects of prescribed medications. Barriers to medication compliance addressed. All patient questions answered. Pt voiced understanding.      DON Cueto

## 2020-08-14 NOTE — PATIENT INSTRUCTIONS
Patient Education        Obsessive-Compulsive Disorder: Care Instructions  Your Care Instructions     Obsessive-compulsive disorder (OCD) makes you have unwanted thoughts that occur over and over. For example, you might always wonder if the oven was turned off before you left home. To get rid of these thoughts, you may also develop a compulsion. This is an action or ritual you perform again and again. You might check several times to make sure the oven is off. The action only makes you feel better for a short time. If you try to resist the urge to do it, you may feel great anxiety or have panic attacks. Counseling (also called therapy) can help you control your thoughts and actions. You may have one-on-one therapy or group therapy, or both. In group therapy, people with the same concerns share their feelings and give each other support. You also may have family therapy. Your loved ones can learn more about how to help you. Your doctor also may prescribe medicine, such as an antidepressant, to help with symptoms. Follow-up care is a key part of your treatment and safety. Be sure to make and go to all appointments, and call your doctor if you are having problems. It's also a good idea to know your test results and keep a list of the medicines you take. How can you care for yourself at home? · Take your medicines exactly as prescribed. Call your doctor if you think you are having a problem with your medicine. You will get more details on the specific medicines your doctor prescribes. · Go to your counseling sessions and follow-up appointments. · Do any homework or exercises that your therapist gives you to do at home. · Involve family members and loved ones in your treatment, especially if your doctor suggests you go to therapy together. · Try to reduce stress. This can help you cope. Some ways to do this include:  ? Taking slow, deep breaths. ? Soaking in a warm bath.   ? Listening to soothing Health Information box to learn more about \"Obsessive-Compulsive Disorder: Care Instructions. \"     If you do not have an account, please click on the \"Sign Up Now\" link. Current as of: January 31, 2020               Content Version: 12.5  © 7730-3206 Healthwise, Incorporated. Care instructions adapted under license by [facility] ([facility]). If you have questions about a medical condition or this instruction, always ask your healthcare professional. [address] any warranty or liability for your use of this information.

## 2022-01-14 ENCOUNTER — OFFICE VISIT (OUTPATIENT)
Dept: PRIMARY CARE CLINIC | Age: 27
End: 2022-01-14
Payer: COMMERCIAL

## 2022-01-14 VITALS
WEIGHT: 138.25 LBS | TEMPERATURE: 98.7 F | SYSTOLIC BLOOD PRESSURE: 124 MMHG | DIASTOLIC BLOOD PRESSURE: 82 MMHG | OXYGEN SATURATION: 98 % | BODY MASS INDEX: 27 KG/M2 | HEART RATE: 59 BPM

## 2022-01-14 DIAGNOSIS — Z01.419 WELL WOMAN EXAM WITH ROUTINE GYNECOLOGICAL EXAM: ICD-10-CM

## 2022-01-14 DIAGNOSIS — Z00.00 ENCOUNTER FOR WELL ADULT EXAM WITHOUT ABNORMAL FINDINGS: Primary | ICD-10-CM

## 2022-01-14 DIAGNOSIS — Z00.00 ENCOUNTER FOR WELL ADULT EXAM WITHOUT ABNORMAL FINDINGS: ICD-10-CM

## 2022-01-14 DIAGNOSIS — B37.9 YEAST INFECTION: ICD-10-CM

## 2022-01-14 LAB
ALBUMIN SERPL-MCNC: 4.7 G/DL (ref 3.5–5.2)
ALP BLD-CCNC: 47 U/L (ref 35–104)
ALT SERPL-CCNC: 14 U/L (ref 5–33)
ANION GAP SERPL CALCULATED.3IONS-SCNC: 14 MMOL/L (ref 7–19)
AST SERPL-CCNC: 15 U/L (ref 5–32)
BASOPHILS ABSOLUTE: 0.1 K/UL (ref 0–0.2)
BASOPHILS RELATIVE PERCENT: 0.7 % (ref 0–1)
BILIRUB SERPL-MCNC: <0.2 MG/DL (ref 0.2–1.2)
BUN BLDV-MCNC: 12 MG/DL (ref 6–20)
CALCIUM SERPL-MCNC: 9.6 MG/DL (ref 8.6–10)
CHLORIDE BLD-SCNC: 106 MMOL/L (ref 98–111)
CHOLESTEROL, TOTAL: 173 MG/DL (ref 160–199)
CO2: 22 MMOL/L (ref 22–29)
CREAT SERPL-MCNC: 0.6 MG/DL (ref 0.5–0.9)
EOSINOPHILS ABSOLUTE: 0.2 K/UL (ref 0–0.6)
EOSINOPHILS RELATIVE PERCENT: 2.2 % (ref 0–5)
GFR AFRICAN AMERICAN: >59
GFR NON-AFRICAN AMERICAN: >60
GLUCOSE BLD-MCNC: 86 MG/DL (ref 74–109)
HBA1C MFR BLD: 4.7 % (ref 4–6)
HCT VFR BLD CALC: 38 % (ref 37–47)
HDLC SERPL-MCNC: 51 MG/DL (ref 65–121)
HEMOGLOBIN: 12.5 G/DL (ref 12–16)
HEPATITIS C ANTIBODY INTERPRETATION: NORMAL
IMMATURE GRANULOCYTES #: 0 K/UL
LDL CHOLESTEROL CALCULATED: 101 MG/DL
LYMPHOCYTES ABSOLUTE: 2.3 K/UL (ref 1.1–4.5)
LYMPHOCYTES RELATIVE PERCENT: 33.9 % (ref 20–40)
MCH RBC QN AUTO: 32.8 PG (ref 27–31)
MCHC RBC AUTO-ENTMCNC: 32.9 G/DL (ref 33–37)
MCV RBC AUTO: 99.7 FL (ref 81–99)
MONOCYTES ABSOLUTE: 0.6 K/UL (ref 0–0.9)
MONOCYTES RELATIVE PERCENT: 8.3 % (ref 0–10)
NEUTROPHILS ABSOLUTE: 3.7 K/UL (ref 1.5–7.5)
NEUTROPHILS RELATIVE PERCENT: 54.6 % (ref 50–65)
PDW BLD-RTO: 11.6 % (ref 11.5–14.5)
PLATELET # BLD: 310 K/UL (ref 130–400)
PMV BLD AUTO: 10.2 FL (ref 9.4–12.3)
POTASSIUM SERPL-SCNC: 4.2 MMOL/L (ref 3.5–5)
RBC # BLD: 3.81 M/UL (ref 4.2–5.4)
SODIUM BLD-SCNC: 142 MMOL/L (ref 136–145)
T4 FREE: 1.22 NG/DL (ref 0.93–1.7)
TOTAL PROTEIN: 7.4 G/DL (ref 6.6–8.7)
TRIGL SERPL-MCNC: 104 MG/DL (ref 0–149)
TSH SERPL DL<=0.05 MIU/L-ACNC: 0.95 UIU/ML (ref 0.27–4.2)
VITAMIN D 25-HYDROXY: 19.3 NG/ML
WBC # BLD: 6.8 K/UL (ref 4.8–10.8)

## 2022-01-14 PROCEDURE — 99395 PREV VISIT EST AGE 18-39: CPT | Performed by: NURSE PRACTITIONER

## 2022-01-14 RX ORDER — FLUCONAZOLE 150 MG/1
150 TABLET ORAL DAILY
Qty: 3 TABLET | Refills: 0 | Status: SHIPPED | OUTPATIENT
Start: 2022-01-14 | End: 2022-01-17

## 2022-01-14 ASSESSMENT — PATIENT HEALTH QUESTIONNAIRE - PHQ9
SUM OF ALL RESPONSES TO PHQ QUESTIONS 1-9: 0
SUM OF ALL RESPONSES TO PHQ QUESTIONS 1-9: 0
1. LITTLE INTEREST OR PLEASURE IN DOING THINGS: 0
2. FEELING DOWN, DEPRESSED OR HOPELESS: 0
SUM OF ALL RESPONSES TO PHQ QUESTIONS 1-9: 0
SUM OF ALL RESPONSES TO PHQ9 QUESTIONS 1 & 2: 0
SUM OF ALL RESPONSES TO PHQ QUESTIONS 1-9: 0

## 2022-01-14 ASSESSMENT — ENCOUNTER SYMPTOMS
COUGH: 0
EYES NEGATIVE: 1
WHEEZING: 0
BACK PAIN: 0
TROUBLE SWALLOWING: 0
SORE THROAT: 0
SHORTNESS OF BREATH: 0

## 2022-01-14 NOTE — PROGRESS NOTES
Well Adult Note  Name: Mariusz Rubio Date: 2022   MRN: [id number] Sex: Female   Age: 27 y.o. Ethnicity: Non- / Non    : 1995 Race: White (non-)      Macy Rice is here for well adult exam.  History:    Eyes:  Up to date  Dentist:  Up to date   Skin:  Checks self follows with derm  SBE:  yes  Mammo:  NA no breast cancer    Pap:  Not up to date last pap 2017: family history of ovarian cancer  Menses regular  Colonoscopy:  Na  Dexa Scan:    Calcium & Vit. D:    MVI:    Supplements:  NONE  Asa:  none  Labs:  fasting  Exercise:  none  Body Image: denies  Diet and Nutr: denies   Depression:  denies  Fall:  denies  EOL:  Rarely   Tobacco:  Tobacco abuse off and on stopped cigs now vaping  Substance:  denies  Immunizations:  Up to date as child / not taken covid vaccine  Sleep off and on  Cognition none    Health Maintenance: none        Review of Systems   Constitutional: Negative for activity change, appetite change, fatigue and fever. HENT: Negative for congestion, postnasal drip, sore throat and trouble swallowing. Eyes: Negative. Respiratory: Negative for cough, shortness of breath and wheezing. Cardiovascular: Negative for chest pain, palpitations and leg swelling. Genitourinary: Negative for difficulty urinating and frequency. Musculoskeletal: Negative for arthralgias and back pain. Skin: Negative for rash. Neurological: Negative for dizziness and headaches. Psychiatric/Behavioral: Negative for agitation, self-injury, sleep disturbance and suicidal ideas. The patient is not nervous/anxious and is not hyperactive. No Known Allergies      Prior to Visit Medications    Not on File       History reviewed. No pertinent past medical history.     Past Surgical History:   Procedure Laterality Date    TUBAL LIGATION      TYMPANOSTOMY TUBE PLACEMENT Bilateral          Family History   Problem Relation Age of Onset    Cancer Mother     Mental Illness Mother     Stroke Paternal Grandmother     Other Paternal Grandfather        Social History     Tobacco Use    Smoking status: Current Every Day Smoker     Packs/day: 0.50     Years: 8.00     Pack years: 4.00     Types: Cigarettes     Start date: 2011    Smokeless tobacco: Never Used   Vaping Use    Vaping Use: Never used   Substance Use Topics    Alcohol use: Yes    Drug use: Never       Objective   /82 (Site: Right Upper Arm, Position: Sitting, Cuff Size: Large Adult)   Pulse 59   Temp 98.7 °F (37.1 °C) (Temporal)   Wt 138 lb 4 oz (62.7 kg)   SpO2 98%   BMI 27.00 kg/m²   Wt Readings from Last 3 Encounters:   01/14/22 138 lb 4 oz (62.7 kg)   10/18/19 145 lb (65.8 kg)   07/12/19 142 lb 6.4 oz (64.6 kg)     There were no vitals filed for this visit. Physical Exam  Vitals reviewed. Exam conducted with a chaperone present. Constitutional:       General: She is not in acute distress. Appearance: Normal appearance. She is not ill-appearing. Comments: Mask     HENT:      Head: Normocephalic. Right Ear: There is no impacted cerumen. Left Ear: There is no impacted cerumen. Nose: No rhinorrhea. Mouth/Throat:      Pharynx: No posterior oropharyngeal erythema. Eyes:      General:         Right eye: No discharge. Left eye: No discharge. Cardiovascular:      Rate and Rhythm: Normal rate and regular rhythm. Pulses: Normal pulses. Heart sounds: No murmur heard. Pulmonary:      Effort: Pulmonary effort is normal.      Breath sounds: Normal breath sounds. No wheezing or rhonchi. Abdominal:      Hernia: There is no hernia in the left inguinal area or right inguinal area. Genitourinary:     Exam position: Knee-chest position. Labia:         Right: No rash. Left: No rash. Urethra: No prolapse. Vagina: Normal. No tenderness or bleeding. Cervix: No erythema or cervical bleeding.       Uterus: Normal.       Adnexa: Right adnexa normal and left adnexa normal.   Skin:     Capillary Refill: Capillary refill takes less than 2 seconds. Findings: No rash. Neurological:      General: No focal deficit present. Mental Status: She is alert and oriented to person, place, and time. Psychiatric:         Mood and Affect: Mood normal.         Behavior: Behavior normal.           Assessment   Plan   1. Encounter for well adult exam without abnormal findings  Will do labs  Consider pap smear  Monitor for HPV      -     CBC Auto Differential; Future  -     Comprehensive Metabolic Panel; Future  -     TSH without Reflex; Future  -     T4, Free; Future  -     Microalbumin / Creatinine Urine Ratio; Future  -     Hemoglobin A1C; Future  -     Lipid Panel; Future  -     Vitamin D 25 Hydroxy; Future  -     Hepatitis C Antibody; Future  -     HIV Screen         Personalized Preventive Plan   Current Health Maintenance Status    There is no immunization history on file for this patient. Health Maintenance   Topic Date Due    Hepatitis C screen  Never done    Varicella vaccine (1 of 2 - 2-dose childhood series) Never done    COVID-19 Vaccine (1) Never done    Pneumococcal 0-64 years Vaccine (1 of 2 - PPSV23) Never done    Depression Screen  Never done    HIV screen  Never done    DTaP/Tdap/Td vaccine (1 - Tdap) Never done    Flu vaccine (1) Never done    Pap smear  01/14/2025    Hepatitis A vaccine  Aged Out    Hepatitis B vaccine  Aged Out    Hib vaccine  Aged Out    Meningococcal (ACWY) vaccine  Aged Out     Recommendations for [employer] Due: see orders and patient instructions/AVS.    Return in about 6 months (around 7/14/2022) for 6 month follow up.

## 2022-01-14 NOTE — PATIENT INSTRUCTIONS
Well Visit, Ages 25 to 48: Care Instructions  Overview     Well visits can help you stay healthy. Your doctor has checked your overall health and may have suggested ways to take good care of yourself. Your doctor also may have recommended tests. At home, you can help prevent illness with healthy eating, regular exercise, and other steps. Follow-up care is a key part of your treatment and safety. Be sure to make and go to all appointments, and call your doctor if you are having problems. It's also a good idea to know your test results and keep a list of the medicines you take. How can you care for yourself at home? · Get screening tests that you and your doctor decide on. Screening helps find diseases before any symptoms appear. · Eat healthy foods. Choose fruits, vegetables, whole grains, protein, and low-fat dairy foods. Limit fat, especially saturated fat. Reduce salt in your diet. · Limit alcohol. If you are a man, have no more than 2 drinks a day or 14 drinks a week. If you are a woman, have no more than 1 drink a day or 7 drinks a week. · Get at least 30 minutes of physical activity on most days of the week. Walking is a good choice. You also may want to do other activities, such as running, swimming, cycling, or playing tennis or team sports. Discuss any changes in your exercise program with your doctor. · Reach and stay at a healthy weight. This will lower your risk for many problems, such as obesity, diabetes, heart disease, and high blood pressure. · Do not smoke or allow others to smoke around you. If you need help quitting, talk to your doctor about stop-smoking programs and medicines. These can increase your chances of quitting for good. · Care for your mental health. It is easy to get weighed down by worry and stress. Learn strategies to manage stress, like deep breathing and mindfulness, and stay connected with your family and community.  If you find you often feel sad or hopeless, talk with your doctor. Treatment can help. · Talk to your doctor about whether you have any risk factors for sexually transmitted infections (STIs). You can help prevent STIs if you wait to have sex with a new partner (or partners) until you've each been tested for STIs. It also helps if you use condoms (male or female condoms) and if you limit your sex partners to one person who only has sex with you. Vaccines are available for some STIs, such as HPV. · Use birth control if it's important to you to prevent pregnancy. Talk with your doctor about the choices available and what might be best for you. · If you think you may have a problem with alcohol or drug use, talk to your doctor. This includes prescription medicines (such as amphetamines and opioids) and illegal drugs (such as cocaine and methamphetamine). Your doctor can help you figure out what type of treatment is best for you. · Protect your skin from too much sun. When you're outdoors from 10 a.m. to 4 p.m., stay in the shade or cover up with clothing and a hat with a wide brim. Wear sunglasses that block UV rays. Even when it's cloudy, put broad-spectrum sunscreen (SPF 30 or higher) on any exposed skin. · See a dentist one or two times a year for checkups and to have your teeth cleaned. · Wear a seat belt in the car. When should you call for help? Watch closely for changes in your health, and be sure to contact your doctor if you have any problems or symptoms that concern you. Where can you learn more? Go to https://iStreamPlanetchemo.healthDrive. org and sign in to your [employer] account. Enter P072 in the [facility] box to learn more about \"Well Visit, Ages 25 to 48: Care Instructions. \"     If you do not have an account, please click on the \"Sign Up Now\" link. Current as of: October 6, 2021               Content Version: 13.1  © 1123-0043 Healthwise, Incorporated. Care instructions adapted under license by [facility] ([facility]).  If you have questions about a medical condition or this instruction, always ask your healthcare professional. [place] [address] any warranty or liability for your use of this information.

## 2022-01-17 ENCOUNTER — TELEPHONE (OUTPATIENT)
Dept: PRIMARY CARE CLINIC | Age: 27
End: 2022-01-17

## 2022-01-17 LAB
CREATININE URINE: 33.1 MG/DL (ref 4.2–622)
MICROALBUMIN UR-MCNC: <1.2 MG/DL (ref 0–19)
MICROALBUMIN/CREAT UR-RTO: NORMAL MG/G

## 2022-01-17 RX ORDER — ERGOCALCIFEROL 1.25 MG/1
50000 CAPSULE ORAL WEEKLY
Qty: 12 CAPSULE | Refills: 1 | Status: SHIPPED | OUTPATIENT
Start: 2022-01-17

## 2022-01-17 NOTE — TELEPHONE ENCOUNTER
----- Message from DON Brantley sent at 1/17/2022  8:06 AM CST -----  Hep c negative  Your vitamin d level is low  Suggest start vitamin d 26293 units 1 po weekly for 8 weeks #4 1 refill  Then recheck level  Thyroid wnl  Cholesterol at goal cont present  Cmp electrolytes liver and kidneys wnl  A1c normal glucose control

## 2022-01-17 NOTE — TELEPHONE ENCOUNTER
----- Message from DON Paz sent at 1/17/2022 12:51 PM CST -----  Urine good no abnormal protein noted

## 2022-01-25 ENCOUNTER — TELEPHONE (OUTPATIENT)
Dept: PRIMARY CARE CLINIC | Age: 27
End: 2022-01-25

## 2022-01-25 DIAGNOSIS — R87.629 ABNORMAL VAGINAL PAP SMEAR: Primary | ICD-10-CM

## 2022-01-25 DIAGNOSIS — R87.820 PAPANICOLAOU SMEAR OF CERVIX WITH LOW RISK HUMAN PAPILLOMAVIRUS (HPV) DNA TEST POSITIVE: ICD-10-CM

## 2022-01-25 LAB
HPV COMMENT: ABNORMAL
HPV TYPE 16: NOT DETECTED
HPV TYPE 18: NOT DETECTED
HPVOH (OTHER TYPES): DETECTED

## 2022-01-25 NOTE — TELEPHONE ENCOUNTER
----- Message from DON Tubbs sent at 1/25/2022  5:51 AM CST -----  Pap abnormal with hpv detected refer to gyn to evaluate and treat

## 2022-01-25 NOTE — TELEPHONE ENCOUNTER
----- Message from DON Ramesh sent at 1/25/2022  5:52 AM CST -----  Noted yeast infection if not treated start diflucan 150mg 1 po daily for 3 days #3 no refill

## 2022-01-28 NOTE — TELEPHONE ENCOUNTER
Called patient and left message for her to call at her convenience.     and [employer] message sent

## 2022-01-31 NOTE — TELEPHONE ENCOUNTER
Called patient, spoke with: Patient regarding the results of the patients most recent pap. I advised Patient of Savi Leyva recommendations.    Patient did voice understanding      Pt already had diflucan on 1/14

## 2022-02-18 ENCOUNTER — OFFICE VISIT (OUTPATIENT)
Dept: OBGYN CLINIC | Age: 27
End: 2022-02-18
Payer: COMMERCIAL

## 2022-02-18 VITALS
HEART RATE: 69 BPM | DIASTOLIC BLOOD PRESSURE: 83 MMHG | SYSTOLIC BLOOD PRESSURE: 121 MMHG | BODY MASS INDEX: 27.68 KG/M2 | WEIGHT: 141 LBS | HEIGHT: 60 IN

## 2022-02-18 DIAGNOSIS — Z80.0 FAMILY HISTORY OF COLON CANCER: ICD-10-CM

## 2022-02-18 DIAGNOSIS — Z71.2 ENCOUNTER TO DISCUSS TEST RESULTS: ICD-10-CM

## 2022-02-18 DIAGNOSIS — Z76.89 ENCOUNTER TO ESTABLISH CARE: Primary | ICD-10-CM

## 2022-02-18 DIAGNOSIS — Z80.41 FAMILY HISTORY OF OVARIAN CANCER: ICD-10-CM

## 2022-02-18 DIAGNOSIS — Z80.51 FAMILY HISTORY OF KIDNEY CANCER: ICD-10-CM

## 2022-02-18 DIAGNOSIS — Z13.79 GENETIC TESTING OF FEMALE: ICD-10-CM

## 2022-02-18 DIAGNOSIS — R87.810 ASCUS WITH POSITIVE HIGH RISK HPV CERVICAL: ICD-10-CM

## 2022-02-18 DIAGNOSIS — R87.610 ASCUS WITH POSITIVE HIGH RISK HPV CERVICAL: ICD-10-CM

## 2022-02-18 PROCEDURE — 57454 BX/CURETT OF CERVIX W/SCOPE: CPT | Performed by: NURSE PRACTITIONER

## 2022-02-18 PROCEDURE — 81025 URINE PREGNANCY TEST: CPT | Performed by: NURSE PRACTITIONER

## 2022-02-18 ASSESSMENT — ENCOUNTER SYMPTOMS
RESPIRATORY NEGATIVE: 1
GASTROINTESTINAL NEGATIVE: 1
EYES NEGATIVE: 1

## 2022-02-18 NOTE — PROGRESS NOTES
Jay Pérez is a 27 y.o. female who presents today for her medical conditions/ complaints as noted below. Jay Pérez is c/o of Establish Care and Procedure (colpo)        HPI  Patient presents today as a new patient for a colposcopy. She states her mom passed away from ovarian cancer, very tearful today. 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 Has been , last pap at least 4 years ago, but doesn't think any abnormal.   Fh of colon and kidney ca as well in 2 paternal aunts. Agreeable to genetic testing today. Consent signed and negative pregnancy test in office  Patient's last menstrual period was 01/20/2022 (approximate). No obstetric history on file. History reviewed. No pertinent past medical history. Past Surgical History:   Procedure Laterality Date    TUBAL LIGATION      TYMPANOSTOMY TUBE PLACEMENT Bilateral      Family History   Problem Relation Age of Onset    Cancer Mother     Mental Illness Mother     Stroke Paternal Grandmother     Other Paternal Grandfather      Social History     Tobacco Use    Smoking status: Current Every Day Smoker     Packs/day: 0.50     Years: 8.00     Pack years: 4.00     Types: Cigarettes     Start date: 2011    Smokeless tobacco: Never Used   Substance Use Topics    Alcohol use: Yes       Current Outpatient Medications   Medication Sig Dispense Refill    vitamin D (ERGOCALCIFEROL) 1.25 MG (67670 UT) CAPS capsule Take 1 capsule by mouth once a week 12 capsule 1     No current facility-administered medications for this visit. No Known Allergies  Vitals:    02/18/22 1449   BP: 121/83   Pulse: 69     Body mass index is 27.54 kg/m². Review of Systems   Constitutional: Negative. HENT: Negative. Eyes: Negative. Respiratory: Negative. Cardiovascular: Negative. Gastrointestinal: Negative.     Genitourinary: Negative for difficulty urinating, dyspareunia, dysuria, enuresis, frequency, hematuria, menstrual problem, pelvic pain, urgency and vaginal discharge. Musculoskeletal: Negative. Skin: Negative. Neurological: Negative. Psychiatric/Behavioral: Negative. Physical Exam  Vitals and nursing note reviewed. Constitutional:       General: She is not in acute distress. Appearance: She is well-developed. She is not diaphoretic. HENT:      Head: Normocephalic and atraumatic. Eyes:      Conjunctiva/sclera: Conjunctivae normal.      Pupils: Pupils are equal, round, and reactive to light. Pulmonary:      Effort: Pulmonary effort is normal.   Abdominal:      Tenderness: There is no guarding. Musculoskeletal:         General: Normal range of motion. Cervical back: Normal range of motion. Comments: Normal ROM in all 4 extremities; normal gait   Skin:     General: Skin is warm and dry. Neurological:      Mental Status: She is alert and oriented to person, place, and time. Motor: No abnormal muscle tone. Coordination: Coordination normal.   Psychiatric:         Behavior: Behavior normal.     Colposcopy Procedure Note    Indications: Pap smear 1 months ago showed: ASCUS with POSITIVE high risk HPV. The prior pap showed unknown. Prior cervical/vaginal disease: none. Prior cervical treatment: none. Procedure Details   The risks and benefits of the procedure and Written informed consent obtained. Speculum placed in vagina and excellent visualization of cervix achieved, cervix swabbed x 3 with acetic acid solution. Findings:  Cervix: acetowhite lesion(s) noted at 6 and 11 o'clock; SCJ visualized - lesion at 6 and 11 o'clock, endocervical curettage performed, cervical biopsies taken at 6 and 11 o'clock, specimen labelled and sent to pathology and hemostasis achieved with silver nitrate. Vaginal inspection: vaginal colposcopy not performed. Vulvar colposcopy: vulvar colposcopy not performed. Specimens: ECC, 6 and 11 oclock bx    Complications: none.     Plan:  Specimens labelled and sent to Pathology. Will base further treatment on Pathology findings. Treatment options discussed with patient. Post biopsy instructions given to patient. Diagnosis Orders   1. Encounter to establish care     2. ASCUS with positive high risk HPV cervical  Surgical Pathology    55927 - OR COLPOSC,CERVIX W/ADJ VAG,W/BX & CURRETAG    POCT urine pregnancy   3. Family history of ovarian cancer     4. Family history of colon cancer     5. Family history of kidney cancer     6. Genetic testing of female     7. Encounter to discuss test results         MEDICATIONS:  No orders of the defined types were placed in this encounter. ORDERS:  Orders Placed This Encounter   Procedures    Surgical Pathology    POCT urine pregnancy    10752 - OR COLPOSC,CERVIX W/ADJ VAG,W/BX & CURRETAG       PLAN:  Specimens sent  Plan pending  Doing Empower today, follow with patrick GRIFFIN for results  Over 50% of the total visit time of 30 minutes was spent on counseling and/or coordination of care and reviewing records, discussion of procedure and genetic testing. All questions were answered and the patient voiced understanding. There are no Patient Instructions on file for this visit.

## 2022-02-24 ENCOUNTER — TELEPHONE (OUTPATIENT)
Dept: OBGYN CLINIC | Age: 27
End: 2022-02-24

## 2022-02-24 NOTE — TELEPHONE ENCOUNTER
----- Message from DON Sosa sent at 2/22/2022  4:52 PM CST -----  I spoke w pt on colpo results. She has tubal ligation and doesn't want anymore children. . please get her set up for a LEEP. I told her you will get in touch w her for scheduling in few days. Thanks!

## 2022-03-02 ENCOUNTER — TELEPHONE (OUTPATIENT)
Dept: OTHER | Age: 27
End: 2022-03-02

## 2022-03-02 NOTE — TELEPHONE ENCOUNTER
Reached out to patient to set up an appointment for a results disclosure regarding her genetic testing results. We will discuss tomorrow at 1000. Patient is positive for a mutation and will be counseled on this and if any referrals are needed, we will do that at this time. A full note will be put in the chart after appointment.

## 2022-03-03 ENCOUNTER — HOSPITAL ENCOUNTER (OUTPATIENT)
Dept: WOMENS IMAGING | Age: 27
Discharge: HOME | End: 2022-03-03
Payer: COMMERCIAL

## 2022-03-03 NOTE — CONSULTS
Completed a  post-test results disclosure discussion with patient. The patient's testing identified a clinically actionable gene mutation. We reviewed the cancer risks associated with the corresponding hereditary cancer predisposition syndrome and the summary of relevant medical management. If riskScore was performed for the patient, it was reviewed in detail. Patient was referred to Tiffany Bloom PA-C for further genetic counseling. Appointment made for 3/7/22. Results uploaded under Media.

## 2022-03-07 ENCOUNTER — TELEPHONE (OUTPATIENT)
Dept: SURGERY | Age: 27
End: 2022-03-07

## 2022-03-07 NOTE — TELEPHONE ENCOUNTER
Ms. Liza Spears and I discussed her genetic testing results today by phone. She has 1 copy of a MUYTH mutation. Based on current research, this may increase her risk for colon cancer. She does not have any family history of colon cancer. Her mother had ovarian cancer and her maternal aunt had uterine cancer. I have recommended she have her first colonoscopy at 34 and then every 5-10 years per GI. I explained to her that research is ongoing and as time goes on we will likely have a better idea of how much her risk of colon cancer is.

## 2022-03-11 ENCOUNTER — OFFICE VISIT (OUTPATIENT)
Dept: OBGYN CLINIC | Age: 27
End: 2022-03-11

## 2022-03-11 VITALS
SYSTOLIC BLOOD PRESSURE: 98 MMHG | WEIGHT: 138 LBS | HEIGHT: 60 IN | BODY MASS INDEX: 27.09 KG/M2 | DIASTOLIC BLOOD PRESSURE: 70 MMHG

## 2022-03-11 DIAGNOSIS — Z01.818 PRE-OP EXAM: ICD-10-CM

## 2022-03-11 DIAGNOSIS — N87.1 DYSPLASIA OF CERVIX, HIGH GRADE CIN 2: Primary | ICD-10-CM

## 2022-03-11 DIAGNOSIS — N87.0 DYSPLASIA OF CERVIX, LOW GRADE (CIN 1): ICD-10-CM

## 2022-03-11 DIAGNOSIS — R79.89 LOW VITAMIN D LEVEL: Primary | ICD-10-CM

## 2022-03-11 DIAGNOSIS — R79.89 LOW VITAMIN D LEVEL: ICD-10-CM

## 2022-03-11 LAB — VITAMIN D 25-HYDROXY: 37.5 NG/ML

## 2022-03-11 PROCEDURE — 99213 OFFICE O/P EST LOW 20 MIN: CPT | Performed by: OBSTETRICS & GYNECOLOGY

## 2022-03-11 ASSESSMENT — ENCOUNTER SYMPTOMS
RESPIRATORY NEGATIVE: 1
EYES NEGATIVE: 1
GASTROINTESTINAL NEGATIVE: 1

## 2022-03-11 NOTE — PROGRESS NOTES
Nathan Man is a 27 y.o. who presents today for her pre op visit for a LEEP on 3-18-22 for SVETA 1 and 2. Review of Systems   Constitutional: Negative. HENT: Negative. Eyes: Negative. Respiratory: Negative. Cardiovascular: Negative. Gastrointestinal: Negative. Genitourinary: Negative. Negative for dysuria, frequency, menstrual problem, pelvic pain, urgency and vaginal discharge. Skin: Negative. Neurological: Negative. Psychiatric/Behavioral: Negative. History reviewed. No pertinent past medical history.     Past Surgical History:   Procedure Laterality Date    LEEP N/A 3/18/2022    LEEP, EUA performed by Evelyne Resendez MD at [place] [address] TYMPANOSTOMY TUBE PLACEMENT Bilateral        Family History   Problem Relation Age of Onset   Wilberto Alvarado Cancer Mother     Mental Illness Mother     Stroke Paternal Grandmother     Other Paternal Grandfather        Social History     Socioeconomic History    Marital status:      Spouse name: Not on file    Number of children: Not on file    Years of education: Not on file    Highest education level: Not on file   Occupational History    Not on file   Tobacco Use    Smoking status: Former Smoker     Packs/day: 0.50     Years: 8.00     Pack years: 4.00     Types: Cigarettes     Start date:      Quit date: 2021     Years since quittin.6    Smokeless tobacco: Never Used   Vaping Use    Vaping Use: Every day    Start date: 2021    Substances: Flavoring    Devices: Disposable   Substance and Sexual Activity    Alcohol use: Yes     Comment: social    Drug use: Never    Sexual activity: Not on file   Other Topics Concern    Not on file   Social History Narrative    Not on file     Social Determinants of Health     Financial Resource Strain:     Difficulty of Paying Living Expenses: Not on file   Food Insecurity:     Worried About [employer] [employer] in the Last Year: Not on file    Ran Out of Food in the Last Year: Not on file   Transportation Needs:     Lack of Transportation (Medical): Not on file    Lack of Transportation (Non-Medical): Not on file   Physical Activity:     Days of Exercise per Week: Not on file    Minutes of Exercise per Session: Not on file   Stress:     Feeling of Stress : Not on file   Social Connections:     Frequency of Communication with Friends and Family: Not on file    Frequency of Social Gatherings with Friends and Family: Not on file    Attends Church Services: Not on file    Active Member of [address] [place] [address] [employer] or Organizations: Not on file    Attends Club or Organization Meetings: Not on file    Marital Status: Not on file   Intimate Partner Violence:     Fear of Current or Ex-Partner: Not on file    Emotionally Abused: Not on file    Physically Abused: Not on file    Sexually Abused: Not on file   Housing Stability:     Unable to Pay for Housing in the Last Year: Not on file    Number of [place] in the Last Year: Not on file    Unstable Housing in the Last Year: Not on file         Current Outpatient Medications:     vitamin D (ERGOCALCIFEROL) 1.25 MG (53214 UT) CAPS capsule, Take 1 capsule by mouth once a week (Patient taking differently: Take 50,000 Units by mouth once a week Wednesday), Disp: 12 capsule, Rfl: 1    ibuprofen (ADVIL;MOTRIN) 800 MG tablet, Take 1 tablet by mouth every 8 hours as needed for Pain, Disp: 60 tablet, Rfl: 0    No Known Allergies    BP 98/70   Ht 5' (1.524 m)   Wt 138 lb (62.6 kg)   LMP 02/22/2022   BMI 26.95 kg/m²   Physical Exam  Constitutional:       General: She is not in acute distress. Appearance: She is well-developed. She is not diaphoretic. HENT:      Head: Normocephalic and atraumatic. Hair is normal.      Right Ear: Hearing and external ear normal. No decreased hearing noted. Left Ear: Hearing and external ear normal. No decreased hearing noted. Nose: Nose normal. No rhinorrhea. Mouth/Throat:      Dentition: Normal dentition. Eyes:      General:         Right eye: No discharge. Left eye: No discharge. Conjunctiva/sclera: Conjunctivae normal.      Pupils: Pupils are equal, round, and reactive to light. Pulmonary:      Effort: Pulmonary effort is normal. No respiratory distress. Musculoskeletal:         General: No deformity. Normal range of motion. Cervical back: Normal range of motion. Skin:     General: Skin is warm and dry. Coloration: Skin is not pale. Findings: No rash. Neurological:      Mental Status: She is alert and oriented to person, place, and time. Cranial Nerves: No cranial nerve deficit. Psychiatric:         Speech: Speech normal.         Behavior: Behavior normal.         Thought Content: Thought content normal.         Judgment: Judgment normal.           FINAL DIAGNOSIS:     A.  Uterine cervix, endocervical curettage: Benign endocervical mucosa   and stratified squamous epithelium. B.  Uterine cervix, biopsy at 6:00: Mild and focally moderate squamous   dysplasia (HGSIL/SVETA-1 and 2). C.  Uterine cervix, biopsy at 11:00: Very focal mild squamous dysplasia   (LGSIL/SVETA-1).    CBG/CBG       Assessment   Diagnosis Orders   1. Dysplasia of cervix, high grade SVETA 2     2. Dysplasia of cervix, low grade (SVETA 1)     3. Pre-op exam         Plan     1. Consent signed for a LEEP on 3-18-22. Procedure and risks discussed, risks include bleeding and infection. 2. RTC 2 weeks post op. Belén Adame am scribing for and in the presence of Dr. Shady Muller. I, Dr. Shady Muller, personally performed the services described in this documentation as scribed by Rebecca Velázquez in my presence, and it is both accurate and complete.

## 2022-03-11 NOTE — PATIENT INSTRUCTIONS
Patient Education        Loop Electrosurgical Excision Procedure (LEEP): What to Expect at Home  Your Recovery     LEEP removes tissue from the cervix. Your procedure removed abnormal tissue from your cervix. You may have mild cramping for several hours after the procedure. A dark brown vaginal discharge during the first week is normal. You can use a sanitary pad for the bleeding. You may also have some spotting for about 3 weeks. How long it takes to recover will depend on how much was done during the procedure. This care sheet gives you a general idea about how long it will take for you to recover. But each person recovers at a different pace. Follow the steps below to feel better as quickly as possible. How can you care for yourself at home? Activity    · You should be able to go back to your normal activities in 1 to 3 days. Medicines    · Your doctor will tell you if and when you can restart your medicines. He or she will also give you instructions about taking any new medicines.     · If you take aspirin or some other blood thinner, ask your doctor if and when to start taking it again. Make sure that you understand exactly what your doctor wants you to do.     · Take an over-the-counter pain medicine, such as acetaminophen (Tylenol), ibuprofen (Advil, Motrin), or naproxen (Aleve). Read and follow all instructions on the label.     · Do not take two or more pain medicines at the same time unless the doctor told you to. Many pain medicines have acetaminophen, which is Tylenol. Too much acetaminophen (Tylenol) can be harmful. Exercise    · Do not exercise for 1 to 3 days after the procedure. Other instructions    · Use a sanitary pad if you have bleeding.     · Do not have sexual intercourse or use tampons for 3 weeks. Do not douche. This will allow your cervix to heal.     · You can take a shower anytime after the procedure.  Ask your doctor when it is okay to take a bath.     · Be sure to have regular follow-up Pap tests. Your doctor can tell you how often you need to have Pap tests. Follow-up care is a key part of your treatment and safety. Be sure to make and go to all appointments, and call your doctor if you are having problems. It's also a good idea to know your test results and keep a list of the medicines you take. When should you call for help? Call 911 anytime you think you may need emergency care. For example, call if:    · You passed out (lost consciousness).     · You have chest pain, are short of breath, or cough up blood. Call your doctor now or seek immediate medical care if:    · You have pain that does not get better after you take pain medicine.     · You cannot pass stools or gas.     · You have signs of infection, such as:  ? Increased pain, swelling, warmth, or redness. ? A fever.     · You have bright red vaginal bleeding that soaks one or more pads in an hour, or you have large clots.     · You have vaginal discharge that has increased in amount or smells bad.     · You are sick to your stomach or cannot drink fluids.     · You have signs of a blood clot in your leg (called a deep vein thrombosis), such as:  ? Pain in your calf, back of the knee, thigh, or groin. ? Redness or swelling in your leg. Watch closely for any changes in your health, and be sure to contact your doctor if you have any problems. Where can you learn more? Go to https://LoyalzoopeCharacter Booster.Insurance Business Applications. org and sign in to your [employer] account. Enter [phone] in the [facility] box to learn more about \"Loop Electrosurgical Excision Procedure (LEEP): What to Expect at Home. \"     If you do not have an account, please click on the \"Sign Up Now\" link. Current as of: September 8, 2021               Content Version: 13.1  © 7352-0661 Healthwise, Incorporated. Care instructions adapted under license by [facility] ([facility]).  If you have questions about a medical condition or this instruction, always ask your healthcare professional. [address] any warranty or liability for your use of this information.

## 2022-03-11 NOTE — PROGRESS NOTES
Gavin Sales presents today for pre-operative visit of  LEEP. Surgery is scheduled for 3/18/2022. History and Physical was performed. Informed consent discussed and she was counseled about the risks of bleeding, infection and damage to organs. She is scheduled for surgery and notified of pre-op arrangements. She is a [id number].

## 2022-03-15 ENCOUNTER — HOSPITAL ENCOUNTER (OUTPATIENT)
Dept: PREADMISSION TESTING | Age: 27
Discharge: HOME | End: 2022-03-19
Attending: OBSTETRICS & GYNECOLOGY
Payer: COMMERCIAL

## 2022-03-15 VITALS — HEIGHT: 60 IN | WEIGHT: 140 LBS | BODY MASS INDEX: 27.48 KG/M2

## 2022-03-15 LAB — SARS-COV-2, PCR: NOT DETECTED

## 2022-03-15 PROCEDURE — U0005 INFEC AGEN DETEC AMPLI PROBE: HCPCS

## 2022-03-15 PROCEDURE — U0003 INFECTIOUS AGENT DETECTION BY NUCLEIC ACID (DNA OR RNA); SEVERE ACUTE RESPIRATORY SYNDROME CORONAVIRUS 2 (SARS-COV-2) (CORONAVIRUS DISEASE [COVID-19]), AMPLIFIED PROBE TECHNIQUE, MAKING USE OF HIGH THROUGHPUT TECHNOLOGIES AS DESCRIBED BY CMS-2020-01-R: HCPCS

## 2022-03-17 ENCOUNTER — ANESTHESIA EVENT (OUTPATIENT)
Dept: OPERATING ROOM | Age: 27
End: 2022-03-17
Payer: COMMERCIAL

## 2022-03-18 ENCOUNTER — HOSPITAL ENCOUNTER (OUTPATIENT)
Age: 27
Setting detail: OUTPATIENT SURGERY
Discharge: HOME | End: 2022-03-18
Attending: OBSTETRICS & GYNECOLOGY | Admitting: OBSTETRICS & GYNECOLOGY
Payer: COMMERCIAL

## 2022-03-18 ENCOUNTER — ANESTHESIA (OUTPATIENT)
Dept: OPERATING ROOM | Age: 27
End: 2022-03-18
Payer: COMMERCIAL

## 2022-03-18 VITALS — OXYGEN SATURATION: 98 % | SYSTOLIC BLOOD PRESSURE: 80 MMHG | TEMPERATURE: 97 F | DIASTOLIC BLOOD PRESSURE: 44 MMHG

## 2022-03-18 VITALS
HEIGHT: 60 IN | WEIGHT: 140 LBS | HEART RATE: 74 BPM | SYSTOLIC BLOOD PRESSURE: 107 MMHG | OXYGEN SATURATION: 100 % | TEMPERATURE: 99.2 F | DIASTOLIC BLOOD PRESSURE: 71 MMHG | RESPIRATION RATE: 16 BRPM | BODY MASS INDEX: 27.48 KG/M2

## 2022-03-18 DIAGNOSIS — Z98.890 S/P LEEP: ICD-10-CM

## 2022-03-18 DIAGNOSIS — N87.9 CERVICAL DYSPLASIA: Primary | ICD-10-CM

## 2022-03-18 PROBLEM — R13.19 CERVICAL DYSPHAGIA: Status: ACTIVE | Noted: 2022-03-18

## 2022-03-18 LAB — HCG(URINE) PREGNANCY TEST: NEGATIVE

## 2022-03-18 PROCEDURE — 57522 CONIZATION OF CERVIX: CPT | Performed by: OBSTETRICS & GYNECOLOGY

## 2022-03-18 PROCEDURE — 7100000010 HC PHASE II RECOVERY - FIRST 15 MIN: Performed by: OBSTETRICS & GYNECOLOGY

## 2022-03-18 PROCEDURE — 3700000001 HC ADD 15 MINUTES (ANESTHESIA): Performed by: OBSTETRICS & GYNECOLOGY

## 2022-03-18 PROCEDURE — 3700000000 HC ANESTHESIA ATTENDED CARE: Performed by: OBSTETRICS & GYNECOLOGY

## 2022-03-18 PROCEDURE — 3600000014 HC SURGERY LEVEL 4 ADDTL 15MIN: Performed by: OBSTETRICS & GYNECOLOGY

## 2022-03-18 PROCEDURE — 2500000003 HC RX 250 WO HCPCS: Performed by: OBSTETRICS & GYNECOLOGY

## 2022-03-18 PROCEDURE — 7100000011 HC PHASE II RECOVERY - ADDTL 15 MIN: Performed by: OBSTETRICS & GYNECOLOGY

## 2022-03-18 PROCEDURE — 6360000002 HC RX W HCPCS: Performed by: NURSE ANESTHETIST, CERTIFIED REGISTERED

## 2022-03-18 PROCEDURE — 88307 TISSUE EXAM BY PATHOLOGIST: CPT

## 2022-03-18 PROCEDURE — 2580000003 HC RX 258: Performed by: ANESTHESIOLOGY

## 2022-03-18 PROCEDURE — 84703 CHORIONIC GONADOTROPIN ASSAY: CPT

## 2022-03-18 PROCEDURE — 2709999900 HC NON-CHARGEABLE SUPPLY: Performed by: OBSTETRICS & GYNECOLOGY

## 2022-03-18 PROCEDURE — 2500000003 HC RX 250 WO HCPCS: Performed by: NURSE ANESTHETIST, CERTIFIED REGISTERED

## 2022-03-18 PROCEDURE — 7100000001 HC PACU RECOVERY - ADDTL 15 MIN: Performed by: OBSTETRICS & GYNECOLOGY

## 2022-03-18 PROCEDURE — 7100000000 HC PACU RECOVERY - FIRST 15 MIN: Performed by: OBSTETRICS & GYNECOLOGY

## 2022-03-18 PROCEDURE — 6370000000 HC RX 637 (ALT 250 FOR IP): Performed by: OBSTETRICS & GYNECOLOGY

## 2022-03-18 PROCEDURE — 3600000004 HC SURGERY LEVEL 4 BASE: Performed by: OBSTETRICS & GYNECOLOGY

## 2022-03-18 PROCEDURE — 88305 TISSUE EXAM BY PATHOLOGIST: CPT

## 2022-03-18 RX ORDER — MIDAZOLAM HYDROCHLORIDE 1 MG/ML
2 INJECTION INTRAMUSCULAR; INTRAVENOUS
Status: DISCONTINUED | OUTPATIENT
Start: 2022-03-18 | End: 2022-03-18 | Stop reason: HOSPADM

## 2022-03-18 RX ORDER — MEPERIDINE HYDROCHLORIDE 25 MG/ML
12.5 INJECTION INTRAMUSCULAR; INTRAVENOUS; SUBCUTANEOUS EVERY 5 MIN PRN
Status: DISCONTINUED | OUTPATIENT
Start: 2022-03-18 | End: 2022-03-18 | Stop reason: HOSPADM

## 2022-03-18 RX ORDER — METOCLOPRAMIDE HYDROCHLORIDE 5 MG/ML
10 INJECTION INTRAMUSCULAR; INTRAVENOUS
Status: DISCONTINUED | OUTPATIENT
Start: 2022-03-18 | End: 2022-03-18 | Stop reason: HOSPADM

## 2022-03-18 RX ORDER — MIDAZOLAM HYDROCHLORIDE 1 MG/ML
INJECTION INTRAMUSCULAR; INTRAVENOUS PRN
Status: DISCONTINUED | OUTPATIENT
Start: 2022-03-18 | End: 2022-03-18 | Stop reason: SDUPTHER

## 2022-03-18 RX ORDER — MORPHINE SULFATE 2 MG/ML
2 INJECTION, SOLUTION INTRAMUSCULAR; INTRAVENOUS EVERY 5 MIN PRN
Status: DISCONTINUED | OUTPATIENT
Start: 2022-03-18 | End: 2022-03-18 | Stop reason: HOSPADM

## 2022-03-18 RX ORDER — SCOLOPAMINE TRANSDERMAL SYSTEM 1 MG/1
1 PATCH, EXTENDED RELEASE TRANSDERMAL
Status: DISCONTINUED | OUTPATIENT
Start: 2022-03-18 | End: 2022-03-18 | Stop reason: HOSPADM

## 2022-03-18 RX ORDER — FAMOTIDINE 20 MG/1
20 TABLET, FILM COATED ORAL ONCE
Status: DISCONTINUED | OUTPATIENT
Start: 2022-03-18 | End: 2022-03-18 | Stop reason: HOSPADM

## 2022-03-18 RX ORDER — PROPOFOL 10 MG/ML
INJECTION, EMULSION INTRAVENOUS PRN
Status: DISCONTINUED | OUTPATIENT
Start: 2022-03-18 | End: 2022-03-18 | Stop reason: SDUPTHER

## 2022-03-18 RX ORDER — DIPHENHYDRAMINE HYDROCHLORIDE 50 MG/ML
12.5 INJECTION INTRAMUSCULAR; INTRAVENOUS
Status: DISCONTINUED | OUTPATIENT
Start: 2022-03-18 | End: 2022-03-18 | Stop reason: HOSPADM

## 2022-03-18 RX ORDER — LIDOCAINE HYDROCHLORIDE 10 MG/ML
1 INJECTION, SOLUTION EPIDURAL; INFILTRATION; INTRACAUDAL; PERINEURAL
Status: DISCONTINUED | OUTPATIENT
Start: 2022-03-18 | End: 2022-03-18 | Stop reason: HOSPADM

## 2022-03-18 RX ORDER — ACETIC ACID 0.25 G/100ML
IRRIGANT IRRIGATION PRN
Status: DISCONTINUED | OUTPATIENT
Start: 2022-03-18 | End: 2022-03-18 | Stop reason: ALTCHOICE

## 2022-03-18 RX ORDER — IBUPROFEN 800 MG/1
800 TABLET ORAL EVERY 8 HOURS PRN
Qty: 60 TABLET | Refills: 0 | Status: SHIPPED | OUTPATIENT
Start: 2022-03-18

## 2022-03-18 RX ORDER — SODIUM CHLORIDE, SODIUM LACTATE, POTASSIUM CHLORIDE, CALCIUM CHLORIDE 600; 310; 30; 20 MG/100ML; MG/100ML; MG/100ML; MG/100ML
INJECTION, SOLUTION INTRAVENOUS CONTINUOUS
Status: DISCONTINUED | OUTPATIENT
Start: 2022-03-18 | End: 2022-03-18 | Stop reason: HOSPADM

## 2022-03-18 RX ORDER — ACETAMINOPHEN AND CODEINE PHOSPHATE 300; 30 MG/1; MG/1
1 TABLET ORAL EVERY 4 HOURS PRN
Qty: 18 TABLET | Refills: 0 | Status: SHIPPED | OUTPATIENT
Start: 2022-03-18 | End: 2022-03-21

## 2022-03-18 RX ORDER — MORPHINE SULFATE 4 MG/ML
4 INJECTION, SOLUTION INTRAMUSCULAR; INTRAVENOUS EVERY 5 MIN PRN
Status: DISCONTINUED | OUTPATIENT
Start: 2022-03-18 | End: 2022-03-18 | Stop reason: HOSPADM

## 2022-03-18 RX ORDER — IODINE SOLUTION STRONG 5% (LUGOL'S) 5 %
SOLUTION ORAL PRN
Status: DISCONTINUED | OUTPATIENT
Start: 2022-03-18 | End: 2022-03-18 | Stop reason: ALTCHOICE

## 2022-03-18 RX ORDER — LIDOCAINE HYDROCHLORIDE 10 MG/ML
INJECTION, SOLUTION EPIDURAL; INFILTRATION; INTRACAUDAL; PERINEURAL PRN
Status: DISCONTINUED | OUTPATIENT
Start: 2022-03-18 | End: 2022-03-18 | Stop reason: SDUPTHER

## 2022-03-18 RX ORDER — FENTANYL CITRATE 50 UG/ML
INJECTION, SOLUTION INTRAMUSCULAR; INTRAVENOUS PRN
Status: DISCONTINUED | OUTPATIENT
Start: 2022-03-18 | End: 2022-03-18 | Stop reason: SDUPTHER

## 2022-03-18 RX ADMIN — FENTANYL CITRATE 25 MCG: 50 INJECTION, SOLUTION INTRAMUSCULAR; INTRAVENOUS at 11:02

## 2022-03-18 RX ADMIN — LIDOCAINE HYDROCHLORIDE 50 MG: 10 INJECTION, SOLUTION EPIDURAL; INFILTRATION; INTRACAUDAL; PERINEURAL at 11:04

## 2022-03-18 RX ADMIN — SODIUM CHLORIDE, SODIUM LACTATE, POTASSIUM CHLORIDE, AND CALCIUM CHLORIDE: 600; 310; 30; 20 INJECTION, SOLUTION INTRAVENOUS at 09:33

## 2022-03-18 RX ADMIN — MIDAZOLAM 2 MG: 1 INJECTION INTRAMUSCULAR; INTRAVENOUS at 10:58

## 2022-03-18 RX ADMIN — PROPOFOL 150 MG: 10 INJECTION, EMULSION INTRAVENOUS at 11:04

## 2022-03-18 ASSESSMENT — ENCOUNTER SYMPTOMS
ALLERGIC/IMMUNOLOGIC NEGATIVE: 1
SHORTNESS OF BREATH: 0
EYES NEGATIVE: 1
RESPIRATORY NEGATIVE: 1
GASTROINTESTINAL NEGATIVE: 1

## 2022-03-18 ASSESSMENT — LIFESTYLE VARIABLES: SMOKING_STATUS: 1

## 2022-03-18 ASSESSMENT — PAIN SCALES - GENERAL
PAINLEVEL_OUTOF10: 0
PAINLEVEL_OUTOF10: 0

## 2022-03-18 NOTE — OP NOTE
PREOPERATIVE DIAGNOSES:   1. SVETA 2  2. SVETA 1    POSTOPERATIVE DIAGNOSES:   1. SVETA 2  2. SVETA 1    PROCEDURE PERFORMED:    1. EUA  2. LEEP  3. ECC    ANESTHESIA:  General    SURGEON:  Dr. Cummings Crew:  Tomi Clancy    ESTIMATED BLOOD LOSS:  15 mL    COMPLICATIONS:  None    SPECIMENS:  LEEP stitch at 12 o'clock, ECC    FINDINGS:  Parous cervix with acetowhite changes and pale Lugol's staining involving 11-1 extending to the superior portio & 4-6 o'clock. DESCRIPTION OF PROCEDURE:  The patient was taken to the operating room where she was placed under general anesthesia. She was positioned in dorsal lithotomy, prepped and draped in usual sterile fashion. A Graves speculum was placed into the vagina. Acetic acid and Lugol's staining was performed with the above findings noted. LEEP was performed and specimen was handed off with stitch placed at 12 o'clock. ECC was obtained with Kevorkian curette. Rollerball cautery was performed over the entire LEEP bed with excellent hemostasis obtained. Monsel's solution was then placed over he LEEP bed. The procedure was concluded. The patient was awakened in the operating room and brought to PACU in stable condition.

## 2022-03-18 NOTE — H&P
ALEX Brock is a 27 y.o. female with h/o cervical dysplasia who presents for LEEP. Review of Systems   Constitutional: Negative. HENT: Negative. Eyes: Negative. Respiratory: Negative. Cardiovascular: Negative. Gastrointestinal: Negative. Endocrine: Negative. Genitourinary: Negative. Musculoskeletal: Negative. Skin: Negative. Allergic/Immunologic: Negative. Neurological: Negative. Hematological: Negative. Psychiatric/Behavioral: Negative. No past medical history on file. Past Surgical History:   Procedure Laterality Date    TUBAL LIGATION      TYMPANOSTOMY TUBE PLACEMENT Bilateral      Family History   Problem Relation Age of Onset    Cancer Mother     Mental Illness Mother     Stroke Paternal Grandmother     Other Paternal Grandfather      Social History     Tobacco Use    Smoking status: Former Smoker     Packs/day: 0.50     Years: 8.00     Pack years: 4.00     Types: Cigarettes     Start date: 2011    Smokeless tobacco: Never Used   Vaping Use    Vaping Use: Every day   Substance Use Topics    Alcohol use: Yes    Drug use: Never     No current facility-administered medications on file prior to encounter. Current Outpatient Medications on File Prior to Encounter   Medication Sig Dispense Refill    vitamin D (ERGOCALCIFEROL) 1.25 MG (60126 UT) CAPS capsule Take 1 capsule by mouth once a week 12 capsule 1     No Known Allergies  LMP 02/22/2022   Physical Exam  Vitals reviewed. Constitutional:       General: She is not in acute distress. Appearance: Normal appearance. She is not ill-appearing or diaphoretic. HENT:      Head: Normocephalic and atraumatic. Nose: Nose normal. No rhinorrhea. Eyes:      General: No scleral icterus. Right eye: No discharge. Left eye: No discharge. Extraocular Movements: Extraocular movements intact. Pulmonary:      Effort: Pulmonary effort is normal. No respiratory distress. Musculoskeletal:         General: Normal range of motion. Skin:     Coloration: Skin is not pale. Findings: No erythema or rash. Neurological:      Mental Status: She is alert and oriented to person, place, and time. Psychiatric:         Attention and Perception: Attention and perception normal.         Mood and Affect: Mood and affect normal.         Speech: Speech normal.         Behavior: Behavior normal. Behavior is cooperative. Thought Content: Thought content normal.         Cognition and Memory: Cognition and memory normal.         Judgment: Judgment normal.       FINAL DIAGNOSIS:     A.  Uterine cervix, endocervical curettage: Benign endocervical mucosa   and stratified squamous epithelium. B.  Uterine cervix, biopsy at 6:00: Mild and focally moderate squamous   dysplasia (HGSIL/SVETA-1 and 2). C.  Uterine cervix, biopsy at 11:00: Very focal mild squamous dysplasia   (LGSIL/SVETA-1).    CBG/CBG     Assessment  1. SVETA 2  2. SVETA 1    Plan  To OR for LEEP. Risks and benefits of procedure reviewed including bleeding, infection, cervical stenosis, cervical incompetence and need for further surgery. Patient states understanding & consent signed.

## 2022-03-18 NOTE — ANESTHESIA PRE PROCEDURE
Department of Anesthesiology  Preprocedure Note       Name:  Rita Morse   Age:  27 y.o.  :  1995                                          MRN:  [id number]         Date:  3/18/2022      Surgeon: Leonie Romero):  Patel Napoles MD    Procedure: Procedure(s):  LEEP, EUA    Medications prior to admission:   Prior to Admission medications    Medication Sig Start Date End Date Taking? Authorizing Provider   vitamin D (ERGOCALCIFEROL) 1.25 MG (44000 UT) CAPS capsule Take 1 capsule by mouth once a week  Patient taking differently: Take 50,000 Units by mouth once a week 22   DON Heath       Current medications:    Current Facility-Administered Medications   Medication Dose Route Frequency Provider Last Rate Last Admin    lactated ringers infusion   IntraVENous Continuous Barrett Neal  mL/hr at 22 09 New Bag at 22 09       Allergies:  No Known Allergies    Problem List:  There is no problem list on file for this patient. Past Medical History:  History reviewed. No pertinent past medical history.     Past Surgical History:        Procedure Laterality Date    TUBAL LIGATION      TYMPANOSTOMY TUBE PLACEMENT Bilateral        Social History:    Social History     Tobacco Use    Smoking status: Former Smoker     Packs/day: 0.50     Years: 8.00     Pack years: 4.00     Types: Cigarettes     Start date:      Quit date: 2021     Years since quittin.6    Smokeless tobacco: Never Used   Substance Use Topics    Alcohol use: Yes     Comment: social                                Counseling given: Not Answered      Vital Signs (Current):   Vitals:    22 0926   BP: 117/64   Pulse: 75   Resp: 18   Temp: 98.1 °F (36.7 °C)   TempSrc: Temporal   SpO2: 100%   Weight: 140 lb (63.5 kg)   Height: 5' (1.524 m)                                              BP Readings from Last 3 Encounters:   22 117/64   22 98/70   22 121/83       NPO Status: Time of last liquid consumption: 0000                        Time of last solid consumption: 0000                        Date of last liquid consumption: 03/17/22                        Date of last solid food consumption: 03/17/22    BMI:   Wt Readings from Last 3 Encounters:   03/18/22 140 lb (63.5 kg)   03/15/22 140 lb (63.5 kg)   03/11/22 138 lb (62.6 kg)     Body mass index is 27.34 kg/m². CBC:   Lab Results   Component Value Date    WBC 6.8 01/14/2022    RBC 3.81 01/14/2022    HGB 12.5 01/14/2022    HCT 38.0 01/14/2022    MCV 99.7 01/14/2022    RDW 11.6 01/14/2022     01/14/2022       CMP:   Lab Results   Component Value Date     01/14/2022    K 4.2 01/14/2022     01/14/2022    CO2 22 01/14/2022    BUN 12 01/14/2022    CREATININE 0.6 01/14/2022    GFRAA >59 01/14/2022    LABGLOM >60 01/14/2022    GLUCOSE 86 01/14/2022    PROT 7.4 01/14/2022    CALCIUM 9.6 01/14/2022    BILITOT <0.2 01/14/2022    ALKPHOS 47 01/14/2022    AST 15 01/14/2022    ALT 14 01/14/2022       POC Tests: No results for input(s): POCGLU, POCNA, POCK, POCCL, POCBUN, POCHEMO, POCHCT in the last 72 hours.     Coags: No results found for: PROTIME, INR, APTT    HCG (If Applicable): No results found for: PREGTESTUR, PREGSERUM, HCG, HCGQUANT     ABGs: No results found for: PHART, PO2ART, TZV9XLF, AMF9UAT, BEART, W7RROTOO     Type & Screen (If Applicable):  No results found for: LABABO, LABRH    Drug/Infectious Status (If Applicable):  No results found for: HIV, HEPCAB    COVID-19 Screening (If Applicable):   Lab Results   Component Value Date    COVID19 Not Detected 03/15/2022           Anesthesia Evaluation  Patient summary reviewed and Nursing notes reviewed no history of anesthetic complications:   Airway: Mallampati: II  TM distance: >3 FB   Neck ROM: full  Mouth opening: > = 3 FB Dental: normal exam         Pulmonary:Negative Pulmonary ROS and normal exam  breath sounds clear to auscultation  (+) current smoker    (-) shortness of breath          Patient did not smoke on day of surgery. Cardiovascular:        (-) hypertension, CAD,  angina and  CHF    NYHA Classification: I  ECG reviewed  Rhythm: regular  Rate: normal           Beta Blocker:  Not on Beta Blocker         Neuro/Psych:   Negative Neuro/Psych ROS     (-) seizures, CVA and depression/anxiety            GI/Hepatic/Renal: Neg GI/Hepatic/Renal ROS       (-) hiatal hernia and GERD       Endo/Other: Negative Endo/Other ROS             Pt had PAT visit. Abdominal:       Abdomen: soft. Vascular: Other Findings:             Anesthesia Plan      general     ASA 2     (Iv zofran within 30 min of closing )  Induction: intravenous. BIS  MIPS: Postoperative opioids intended and Prophylactic antiemetics administered. Anesthetic plan and risks discussed with patient. Use of blood products discussed with patient whom. Plan discussed with CRNA.     Attending anesthesiologist reviewed and agrees with Pre Eval content              Taina Eaton MD   3/18/2022

## 2022-03-18 NOTE — ANESTHESIA POSTPROCEDURE EVALUATION
Department of Anesthesiology  Postprocedure Note    Patient: Fermín Uriarte  MRN: [id number]  YOB: 1995  Date of evaluation: 3/18/2022  Time:  11:40 AM     Procedure Summary     Date: 03/18/22 Room / Location: [facility] OR [address] [place] [address]    Anesthesia Start: 9601 Anesthesia Stop: 1137    Procedure: LEEP, EUA (N/A ) Diagnosis: (N87.1)    Surgeons: Rubi Lopez MD Responsible Provider: Baldo Soulier, APRN - CRNA    Anesthesia Type: general ASA Status: 2          Anesthesia Type: general    Judith Phase I: Judith Score: 9    Judith Phase II:      Last vitals: Reviewed and per EMR flowsheets.        Anesthesia Post Evaluation    Patient location during evaluation: PACU  Patient participation: complete - patient participated  Level of consciousness: sleepy but conscious  Pain score: 0  Airway patency: patent  Nausea & Vomiting: no nausea and no vomiting  Complications: no  Cardiovascular status: hemodynamically stable  Respiratory status: acceptable  Hydration status: stable

## 2022-04-05 ENCOUNTER — OFFICE VISIT (OUTPATIENT)
Dept: OBGYN CLINIC | Age: 27
End: 2022-04-05

## 2022-04-05 VITALS
BODY MASS INDEX: 27.88 KG/M2 | HEIGHT: 60 IN | WEIGHT: 142 LBS | HEART RATE: 85 BPM | SYSTOLIC BLOOD PRESSURE: 116 MMHG | DIASTOLIC BLOOD PRESSURE: 49 MMHG

## 2022-04-05 DIAGNOSIS — N87.0 DYSPLASIA OF CERVIX, LOW GRADE (CIN 1): ICD-10-CM

## 2022-04-05 DIAGNOSIS — Z98.890 H/O LEEP: Primary | ICD-10-CM

## 2022-04-05 DIAGNOSIS — Z09 POSTOP CHECK: ICD-10-CM

## 2022-04-05 PROCEDURE — 99024 POSTOP FOLLOW-UP VISIT: CPT | Performed by: NURSE PRACTITIONER

## 2022-04-05 ASSESSMENT — ENCOUNTER SYMPTOMS
RESPIRATORY NEGATIVE: 1
GASTROINTESTINAL NEGATIVE: 1
EYES NEGATIVE: 1

## 2022-04-05 NOTE — PROGRESS NOTES
Cindy Hunt is a 27 y.o. female who presents today for her medical conditions/ complaints as noted below. Cindy Hunt is c/o of Post-Op Check (LEEP)        HPI  Patient presents today for 2 week post op leep - no complaints  Pathology, margins clear, kwaku 1. Has been under stress lately with school. Wonders if the hpv/abnormal pap can recur. Patient's last menstrual period was 2022 (exact date).     History reviewed. No pertinent past medical history. Past Surgical History:   Procedure Laterality Date    LEEP N/A 3/18/2022    LEEP, EUA performed by Zakiya Phoenix MD at [facility] OR    TUBAL LIGATION      TYMPANOSTOMY TUBE PLACEMENT Bilateral      Family History   Problem Relation Age of Onset   Blase Liming Cancer Mother     Mental Illness Mother     Stroke Paternal Grandmother     Other Paternal Grandfather      Social History     Tobacco Use    Smoking status: Former Smoker     Packs/day: 0.50     Years: 8.00     Pack years: 4.00     Types: Cigarettes     Start date:      Quit date: 2021     Years since quittin.6    Smokeless tobacco: Never Used   Substance Use Topics    Alcohol use: Yes     Comment: social       Current Outpatient Medications   Medication Sig Dispense Refill    ibuprofen (ADVIL;MOTRIN) 800 MG tablet Take 1 tablet by mouth every 8 hours as needed for Pain 60 tablet 0    vitamin D (ERGOCALCIFEROL) 1.25 MG (56439 UT) CAPS capsule Take 1 capsule by mouth once a week (Patient taking differently: Take 50,000 Units by mouth once a week Wednesday) 12 capsule 1     No current facility-administered medications for this visit. No Known Allergies  Vitals:    22 1618   BP: (!) 116/49   Pulse: 85     Body mass index is 27.73 kg/m². Review of Systems   Constitutional: Negative. HENT: Negative. Eyes: Negative. Respiratory: Negative. Cardiovascular: Negative. Gastrointestinal: Negative.     Genitourinary: Negative for difficulty urinating, dyspareunia, dysuria, enuresis, frequency, hematuria, menstrual problem, pelvic pain, urgency and vaginal discharge. Musculoskeletal: Negative. Skin: Negative. Neurological: Negative. Psychiatric/Behavioral: The patient is nervous/anxious. Physical Exam  Vitals and nursing note reviewed. Constitutional:       General: She is not in acute distress. Appearance: She is well-developed. She is not diaphoretic. HENT:      Head: Normocephalic and atraumatic. Eyes:      Conjunctiva/sclera: Conjunctivae normal.      Pupils: Pupils are equal, round, and reactive to light. Pulmonary:      Effort: Pulmonary effort is normal.   Abdominal:      Tenderness: There is no guarding. Musculoskeletal:         General: Normal range of motion. Cervical back: Normal range of motion. Comments: Normal ROM in all 4 extremities; normal gait   Skin:     General: Skin is warm and dry. Neurological:      Mental Status: She is alert and oriented to person, place, and time. Motor: No abnormal muscle tone. Coordination: Coordination normal.   Psychiatric:         Behavior: Behavior normal.          Diagnosis Orders   1. H/O LEEP     2. Postop check     3. Dysplasia of cervix, low grade (SVETA 1)         MEDICATIONS:  No orders of the defined types were placed in this encounter. ORDERS:  No orders of the defined types were placed in this encounter. PLAN:  Counseling was offered and accepted by patient. The operative report and surgical findings were discussed with the patient. Aware of clear margins and SVETA 1. She was instructed to gradually resume normal activity. She was also instructed to call or return to the office if complications occur. Patient voiced understanding of above. We discussed minimizing stress, discussed strategies. There are no Patient Instructions on file for this visit.

## 2023-02-03 ENCOUNTER — TELEPHONE (OUTPATIENT)
Dept: FAMILY MEDICINE CLINIC | Age: 28
End: 2023-02-03

## 2023-02-03 ENCOUNTER — OFFICE VISIT (OUTPATIENT)
Dept: FAMILY MEDICINE CLINIC | Age: 28
End: 2023-02-03
Payer: COMMERCIAL

## 2023-02-03 VITALS
SYSTOLIC BLOOD PRESSURE: 100 MMHG | DIASTOLIC BLOOD PRESSURE: 70 MMHG | BODY MASS INDEX: 28.96 KG/M2 | OXYGEN SATURATION: 96 % | HEART RATE: 61 BPM | WEIGHT: 147.5 LBS | TEMPERATURE: 98.1 F | HEIGHT: 60 IN

## 2023-02-03 DIAGNOSIS — Z00.00 ENCOUNTER FOR WELL ADULT EXAM WITHOUT ABNORMAL FINDINGS: ICD-10-CM

## 2023-02-03 DIAGNOSIS — R79.89 LOW VITAMIN D LEVEL: Primary | ICD-10-CM

## 2023-02-03 DIAGNOSIS — Z00.00 ENCOUNTER FOR WELL ADULT EXAM WITHOUT ABNORMAL FINDINGS: Primary | ICD-10-CM

## 2023-02-03 DIAGNOSIS — F33.0 MILD EPISODE OF RECURRENT MAJOR DEPRESSIVE DISORDER (HCC): ICD-10-CM

## 2023-02-03 DIAGNOSIS — E55.9 VITAMIN D DEFICIENCY: ICD-10-CM

## 2023-02-03 DIAGNOSIS — F51.01 PRIMARY INSOMNIA: ICD-10-CM

## 2023-02-03 DIAGNOSIS — F41.1 GAD (GENERALIZED ANXIETY DISORDER): ICD-10-CM

## 2023-02-03 LAB
ALBUMIN SERPL-MCNC: 4.4 G/DL (ref 3.5–5.2)
ALP BLD-CCNC: 40 U/L (ref 35–104)
ALT SERPL-CCNC: 10 U/L (ref 5–33)
ANION GAP SERPL CALCULATED.3IONS-SCNC: 15 MMOL/L (ref 7–19)
AST SERPL-CCNC: 14 U/L (ref 5–32)
BASOPHILS ABSOLUTE: 0.1 K/UL (ref 0–0.2)
BASOPHILS RELATIVE PERCENT: 1.3 % (ref 0–1)
BILIRUB SERPL-MCNC: 0.5 MG/DL (ref 0.2–1.2)
BUN BLDV-MCNC: 14 MG/DL (ref 6–20)
CALCIUM SERPL-MCNC: 9.3 MG/DL (ref 8.6–10)
CHLORIDE BLD-SCNC: 104 MMOL/L (ref 98–111)
CHOLESTEROL, TOTAL: 175 MG/DL (ref 160–199)
CO2: 19 MMOL/L (ref 22–29)
CREAT SERPL-MCNC: 0.7 MG/DL (ref 0.5–0.9)
EOSINOPHILS ABSOLUTE: 0.1 K/UL (ref 0–0.6)
EOSINOPHILS RELATIVE PERCENT: 1.5 % (ref 0–5)
GFR SERPL CREATININE-BSD FRML MDRD: >60 ML/MIN/{1.73_M2}
GLUCOSE BLD-MCNC: 92 MG/DL (ref 74–109)
HCT VFR BLD CALC: 41.3 % (ref 37–47)
HDLC SERPL-MCNC: 51 MG/DL (ref 65–121)
HEMOGLOBIN: 13.3 G/DL (ref 12–16)
IMMATURE GRANULOCYTES #: 0 K/UL
LDL CHOLESTEROL CALCULATED: 112 MG/DL
LYMPHOCYTES ABSOLUTE: 1.6 K/UL (ref 1.1–4.5)
LYMPHOCYTES RELATIVE PERCENT: 33.3 % (ref 20–40)
MCH RBC QN AUTO: 32.4 PG (ref 27–31)
MCHC RBC AUTO-ENTMCNC: 32.2 G/DL (ref 33–37)
MCV RBC AUTO: 100.7 FL (ref 81–99)
MONOCYTES ABSOLUTE: 0.4 K/UL (ref 0–0.9)
MONOCYTES RELATIVE PERCENT: 8.9 % (ref 0–10)
NEUTROPHILS ABSOLUTE: 2.6 K/UL (ref 1.5–7.5)
NEUTROPHILS RELATIVE PERCENT: 54.8 % (ref 50–65)
PDW BLD-RTO: 11.4 % (ref 11.5–14.5)
PLATELET # BLD: 293 K/UL (ref 130–400)
PMV BLD AUTO: 10.1 FL (ref 9.4–12.3)
POTASSIUM SERPL-SCNC: 4.8 MMOL/L (ref 3.5–5)
RBC # BLD: 4.1 M/UL (ref 4.2–5.4)
SODIUM BLD-SCNC: 138 MMOL/L (ref 136–145)
TOTAL PROTEIN: 7.1 G/DL (ref 6.6–8.7)
TRIGL SERPL-MCNC: 62 MG/DL (ref 0–149)
TSH SERPL DL<=0.05 MIU/L-ACNC: 1.92 UIU/ML (ref 0.27–4.2)
VITAMIN D 25-HYDROXY: 16.5 NG/ML
WBC # BLD: 4.7 K/UL (ref 4.8–10.8)

## 2023-02-03 PROCEDURE — 99395 PREV VISIT EST AGE 18-39: CPT | Performed by: NURSE PRACTITIONER

## 2023-02-03 RX ORDER — ERGOCALCIFEROL 1.25 MG/1
50000 CAPSULE ORAL WEEKLY
Qty: 4 CAPSULE | Refills: 0 | Status: SHIPPED | OUTPATIENT
Start: 2023-02-03

## 2023-02-03 RX ORDER — FLUOXETINE 10 MG/1
10 CAPSULE ORAL DAILY
Qty: 30 CAPSULE | Refills: 3 | Status: SHIPPED | OUTPATIENT
Start: 2023-02-03

## 2023-02-03 ASSESSMENT — ENCOUNTER SYMPTOMS
COUGH: 0
CONSTIPATION: 0
RHINORRHEA: 0
SORE THROAT: 0
DIARRHEA: 0
VOMITING: 0
EYE REDNESS: 0
SHORTNESS OF BREATH: 0

## 2023-02-03 ASSESSMENT — PATIENT HEALTH QUESTIONNAIRE - PHQ9
1. LITTLE INTEREST OR PLEASURE IN DOING THINGS: 0
SUM OF ALL RESPONSES TO PHQ QUESTIONS 1-9: 0
SUM OF ALL RESPONSES TO PHQ9 QUESTIONS 1 & 2: 0
2. FEELING DOWN, DEPRESSED OR HOPELESS: 0

## 2023-02-03 NOTE — TELEPHONE ENCOUNTER
----- Message from DON Carr sent at 2/3/2023 11:52 AM CST -----  Thyroid is normal  CBC is essentially within normal limits.

## 2023-02-03 NOTE — TELEPHONE ENCOUNTER
----- Message from DON Faust sent at 2/3/2023  3:31 PM CST -----  Vitamin D is low. Recommend vitamin D 50,000 units weekly x 4 weeks. Disp: 4. Refills: 3  Recheck vitamin D in 3 months    Cholesterol is borderline. Eat a variety of foods every day. Replace red meat with fish, poultry, and soy protein (like tofu). Limit processed and packaged foods like chips, crackers, and cookies. Bake, broil, or steam foods. Don't chase them. Limit foods high in cholesterol. These include egg yolks. Be physically active. Get at least 30 minutes of exercise on most days of the week. Walking is a good choice. You also may want to do other activities, such as running, swimming, cycling, or playing tennis or team sports. Stay at a healthy weight or lose weight by making the changes in eating and physical activity listed above. Losing just a small amount of weight, even 5 to 10 pounds, can reduce your risk for having a heart attack or stroke.     CMP: WNL, this includes normal electrolytes, kidney and liver fxn

## 2023-02-03 NOTE — PATIENT INSTRUCTIONS
Well Visit, Ages 25 to 72: Care Instructions  Well visits can help you stay healthy. Your doctor has checked your overall health and may have suggested ways to take good care of yourself. Your doctor also may have recommended tests. You can help prevent illness with healthy eating, good sleep, vaccinations, regular exercise, and other steps. Get the tests that you and your doctor decide on. Depending on your age and risks, examples might include screening for diabetes; hepatitis C; HIV; and cervical, breast, lung, and colon cancer. Screening helps find diseases before any symptoms appear. Eat healthy foods. Choose fruits, vegetables, whole grains, lean protein, and low-fat dairy foods. Limit saturated fat and reduce salt. Limit alcohol. Men should have no more than 2 drinks a day. Women should have no more than 1. For some people, no alcohol is the best choice. Exercise. Get at least 30 minutes of exercise on most days of the week. Walking can be a good choice. Reach and stay at your healthy weight. This will lower your risk for many health problems. Take care of your mental health. Try to stay connected with friends, family, and community, and find ways to manage stress. If you're feeling depressed or hopeless, talk to someone. A counselor can help. If you don't have a counselor, talk to your doctor. Talk to your doctor if you think you may have a problem with alcohol or drug use. This includes prescription medicines and illegal drugs. Avoid tobacco and nicotine: Don't smoke, vape, or chew. If you need help quitting, talk to your doctor. Practice safer sex. Getting tested, using condoms or dental dams, and limiting sex partners can help prevent STIs. Use birth control if it's important to you to prevent pregnancy. Talk with your doctor about your choices and what might be best for you. Prevent problems where you can.  Protect your skin from too much sun, wash your hands, brush your teeth twice a day, and wear a seat belt in the car. Where can you learn more? Go to http://www.correia.com/ and enter P072 to learn more about \"Well Visit, Ages 25 to 72: Care Instructions. \"  Current as of: March 9, 2022               Content Version: 13.5  © 8364-1516 [employer]. Care instructions adapted under license by [facility] ([facility]). If you have questions about a medical condition or this instruction, always ask your healthcare professional. [address] any warranty or liability for your use of this information.

## 2023-02-03 NOTE — TELEPHONE ENCOUNTER
Called patient, spoke with: Patient regarding the results of the patients most recent Labs. I advised Patient of Elenita Gurrola recommendations.    Patient did voice understanding

## 2023-02-03 NOTE — PROGRESS NOTES
Well Adult Note  Name: Pancho Capone Date: 2/3/2023   MRN: [id number] Sex: Female   Age: 32 y.o. Ethnicity: Non- / Non    : 1995 Race: White (non-)      Hortensia Bose is here for well adult exam.  History:    Eyes:  Going today  Dentist:  2022  SBE:  Yes  Mammo:  Not due    Pap:    \"I have appointment with OBGYN in April. \"  Menses: Monthly  Colonoscopy:  Denies any blood in stool  \"My aunt Sheryl Ferrell has colon cancer. \"  Dexa Scan:  n/a  Calcium & Vit. D:  No  MVI:  No  Supplements:  no  Asa:  No  Labs:  Due  Exercise:  \"I have been working out at least 3x a week. \"  Body Image: No concerns  Diet and Nutr: \"I do intermittent fasting. \"   Depression:  \"I get irritated really fast.\"  Reports lack of motivation  Denies sadness or tearfulness  The [employer] helped in the past.  \"I am working on my Bachelor's degree and working 60 hours a week. \"  Tobacco:  \"I use to smoke 1/2 ppd. I quit a 1.5 years ago and started vaping. \"  Substance:  ETOH rarely  Sleep \"I don't sleep very well. \"  Reports mind racing at night. Cognition No concerns    Review of Systems   Constitutional:  Negative for chills, fatigue and fever. HENT:  Negative for congestion, ear pain, rhinorrhea and sore throat. Eyes:  Negative for redness. Respiratory:  Negative for cough and shortness of breath. Cardiovascular:  Negative for chest pain. Gastrointestinal:  Negative for constipation, diarrhea and vomiting. Skin:  Negative for rash. Neurological:  Negative for dizziness and headaches. Psychiatric/Behavioral:  Positive for sleep disturbance. The patient is nervous/anxious. Lack of motivation       No Known Allergies      Prior to Visit Medications    Medication Sig Taking?  Authorizing Provider   ibuprofen (ADVIL;MOTRIN) 800 MG tablet Take 1 tablet by mouth every 8 hours as needed for Pain Yes Jose Azul MD   FLUoxetine (PROZAC) 10 MG capsule Take 1 capsule by mouth daily Yes DON Leo       History reviewed. No pertinent past medical history. Past Surgical History:   Procedure Laterality Date    CERVIX BIOPSY      LEEP N/A 2022    JIA, GABRIELAA performed by Stacy Dee MD at [facility] OR    TUBAL LIGATION      TYMPANOSTOMY TUBE PLACEMENT Bilateral          Family History   Problem Relation Age of Onset    Cancer Mother     Mental Illness Mother     Ovarian Cancer Mother     Stroke Paternal Grandmother     Other Paternal Grandfather        Social History     Tobacco Use    Smoking status: Former     Packs/day: 0.50     Years: 8.00     Pack years: 4.00     Types: Cigarettes     Start date:      Quit date: 2021     Years since quittin.5    Smokeless tobacco: Never   Vaping Use    Vaping Use: Every day    Start date: 2021    Substances: Flavoring    Devices: Disposable   Substance Use Topics    Alcohol use: Yes     Comment: social    Drug use: Never       Objective   /70   Pulse 61   Temp 98.1 °F (36.7 °C) (Temporal)   Ht 5' (1.524 m)   Wt 147 lb 8 oz (66.9 kg)   LMP 01/10/2023 (Approximate)   SpO2 96%   BMI 28.81 kg/m²   Wt Readings from Last 3 Encounters:   23 147 lb 8 oz (66.9 kg)   22 142 lb (64.4 kg)   03/15/22 140 lb (63.5 kg)     There were no vitals filed for this visit. Physical Exam  Vitals reviewed. Constitutional:       Appearance: Normal appearance. She is well-developed and normal weight. HENT:      Head: Normocephalic. Right Ear: Tympanic membrane, ear canal and external ear normal.      Left Ear: Tympanic membrane, ear canal and external ear normal.      Nose: Nose normal.   Eyes:      General:         Right eye: No discharge. Left eye: No discharge. Neck:      Vascular: No carotid bruit. Cardiovascular:      Rate and Rhythm: Normal rate and regular rhythm. Pulmonary:      Effort: Pulmonary effort is normal.      Breath sounds: Normal breath sounds. No wheezing, rhonchi or rales. Abdominal:      General: Bowel sounds are normal.      Palpations: Abdomen is soft. Musculoskeletal:         General: Normal range of motion. Cervical back: Normal range of motion. Right lower leg: No edema. Left lower leg: No edema. Skin:     General: Skin is dry. Neurological:      General: No focal deficit present. Mental Status: She is alert and oriented to person, place, and time. Mental status is at baseline. Psychiatric:         Mood and Affect: Mood normal.         Behavior: Behavior normal.         Thought Content: Thought content normal.         Judgment: Judgment normal.       Assessment   Plan   1. Encounter for well adult exam without abnormal findings  -     CBC with Auto Differential; Future  -     Comprehensive Metabolic Panel; Future  -     TSH; Future  -     Lipid Panel; Future  -     Vitamin D 25 Hydroxy; Future  2. Vitamin D deficiency  -     Vitamin D 25 Hydroxy; Future  3. EVONNE (generalized anxiety disorder)  -     FLUoxetine (PROZAC) 10 MG capsule; Take 1 capsule by mouth daily, Disp-30 capsule, R-3Normal  4. Mild episode of recurrent major depressive disorder (HCC)  -     FLUoxetine (PROZAC) 10 MG capsule; Take 1 capsule by mouth daily, Disp-30 capsule, R-3Normal  5. Primary insomnia  -     FLUoxetine (PROZAC) 10 MG capsule; Take 1 capsule by mouth daily, Disp-30 capsule, R-3Normal   Discussed SE of antidepressants: including weight gain, mood changes, possible sexual dysfunction. Discussed not stopping medication abruptly without consulting office. Pt wishes to proceed. Personalized Preventive Plan   Current Health Maintenance Status    There is no immunization history on file for this patient.      Health Maintenance   Topic Date Due    COVID-19 Vaccine (1) Never done    Varicella vaccine (1 of 2 - 2-dose childhood series) Never done    HIV screen  Never done    DTaP/Tdap/Td vaccine (1 - Tdap) Never done    Flu vaccine (1) Never done    Depression Screen 01/14/2023    Pap smear  01/14/2025    Hepatitis C screen  Completed    Hepatitis A vaccine  Aged Out    Hib vaccine  Aged Out    Meningococcal (ACWY) vaccine  Aged Out    Pneumococcal 0-64 years Vaccine  Aged Out     Recommendations for Nev[place] Due: see orders and patient instructions/AVS.    Return in about 1 month (around 3/3/2023), or if symptoms worsen or fail to improve.

## 2023-03-21 ENCOUNTER — OFFICE VISIT (OUTPATIENT)
Dept: FAMILY MEDICINE CLINIC | Age: 28
End: 2023-03-21
Payer: COMMERCIAL

## 2023-03-21 VITALS
SYSTOLIC BLOOD PRESSURE: 110 MMHG | HEIGHT: 60 IN | BODY MASS INDEX: 29.45 KG/M2 | DIASTOLIC BLOOD PRESSURE: 70 MMHG | TEMPERATURE: 97.6 F | OXYGEN SATURATION: 99 % | HEART RATE: 87 BPM | WEIGHT: 150 LBS

## 2023-03-21 DIAGNOSIS — F43.9 STRESS: ICD-10-CM

## 2023-03-21 DIAGNOSIS — R79.89 LOW VITAMIN D LEVEL: ICD-10-CM

## 2023-03-21 DIAGNOSIS — F41.1 GAD (GENERALIZED ANXIETY DISORDER): Primary | ICD-10-CM

## 2023-03-21 DIAGNOSIS — Z91.89 LACK OF MOTIVATION: ICD-10-CM

## 2023-03-21 DIAGNOSIS — E55.9 VITAMIN D DEFICIENCY: ICD-10-CM

## 2023-03-21 DIAGNOSIS — S46.812A STRAIN OF LEFT TRAPEZIUS MUSCLE, INITIAL ENCOUNTER: ICD-10-CM

## 2023-03-21 DIAGNOSIS — F33.0 MILD EPISODE OF RECURRENT MAJOR DEPRESSIVE DISORDER (HCC): ICD-10-CM

## 2023-03-21 DIAGNOSIS — R79.89 LOW VITAMIN D LEVEL: Primary | ICD-10-CM

## 2023-03-21 LAB — 25(OH)D3 SERPL-MCNC: 25.7 NG/ML

## 2023-03-21 PROCEDURE — 99214 OFFICE O/P EST MOD 30 MIN: CPT | Performed by: NURSE PRACTITIONER

## 2023-03-21 RX ORDER — BUPROPION HYDROCHLORIDE 150 MG/1
150 TABLET ORAL EVERY MORNING
Qty: 30 TABLET | Refills: 5 | Status: SHIPPED | OUTPATIENT
Start: 2023-03-21

## 2023-03-21 RX ORDER — ERGOCALCIFEROL 1.25 MG/1
50000 CAPSULE ORAL WEEKLY
Qty: 4 CAPSULE | Refills: 1 | Status: SHIPPED | OUTPATIENT
Start: 2023-03-21

## 2023-03-21 ASSESSMENT — ENCOUNTER SYMPTOMS
DIARRHEA: 0
SORE THROAT: 0
COUGH: 0
SHORTNESS OF BREATH: 0
ABDOMINAL PAIN: 0
VOMITING: 0
NAUSEA: 0
RHINORRHEA: 1

## 2023-03-21 NOTE — TELEPHONE ENCOUNTER
Called patient, spoke with: Patient regarding the results of the patients most recent Labs. I advised Patient of Casimiro Mckeon recommendations.    Patient did voice understanding      Medication pending signature

## 2023-03-21 NOTE — TELEPHONE ENCOUNTER
----- Message from DON Collazo sent at 3/21/2023 12:16 PM CDT -----  Your vitamin d level is low  Suggest start vitamin d 08399 units 1 po weekly for 8 weeks #4 1 refill  Then recheck level

## 2023-03-21 NOTE — PROGRESS NOTES
Respiratory:  Negative for cough and shortness of breath. Cardiovascular:  Negative for chest pain and palpitations. Gastrointestinal:  Negative for abdominal pain, diarrhea, nausea and vomiting. Musculoskeletal:  Positive for neck pain. Neurological:  Positive for headaches (continued but improved from yesterday). Psychiatric/Behavioral:  Positive for sleep disturbance (improved). The patient is nervous/anxious (improved). Lack of motivation     Physical Exam  Vitals reviewed. Constitutional:       Appearance: She is well-developed. HENT:      Head: Normocephalic. Right Ear: Tympanic membrane and external ear normal.      Left Ear: Tympanic membrane and external ear normal.      Nose: Nose normal.   Eyes:      General:         Right eye: No discharge. Left eye: No discharge. Cardiovascular:      Rate and Rhythm: Normal rate and regular rhythm. Pulmonary:      Effort: Pulmonary effort is normal.      Breath sounds: Normal breath sounds. No wheezing, rhonchi or rales. Abdominal:      General: Bowel sounds are normal.      Palpations: Abdomen is soft. Musculoskeletal:      Cervical back: Normal range of motion. Spasms (left trapezius muscle spasm) and tenderness (left trapezius muscle) present. Skin:     General: Skin is dry. Neurological:      General: No focal deficit present. Mental Status: She is alert and oriented to person, place, and time. Mental status is at baseline. Psychiatric:         Mood and Affect: Mood normal.         Behavior: Behavior normal.         Thought Content: Thought content normal.         Judgment: Judgment normal.           An electronic signature was used to authenticate this note.     --DON House

## 2023-05-03 ENCOUNTER — OFFICE VISIT (OUTPATIENT)
Dept: FAMILY MEDICINE CLINIC | Age: 28
End: 2023-05-03
Payer: COMMERCIAL

## 2023-05-03 ENCOUNTER — TELEPHONE (OUTPATIENT)
Dept: FAMILY MEDICINE CLINIC | Age: 28
End: 2023-05-03

## 2023-05-03 VITALS
WEIGHT: 149 LBS | SYSTOLIC BLOOD PRESSURE: 117 MMHG | HEART RATE: 77 BPM | BODY MASS INDEX: 29.1 KG/M2 | DIASTOLIC BLOOD PRESSURE: 69 MMHG | OXYGEN SATURATION: 98 % | TEMPERATURE: 98.5 F

## 2023-05-03 DIAGNOSIS — F41.1 GAD (GENERALIZED ANXIETY DISORDER): Primary | ICD-10-CM

## 2023-05-03 DIAGNOSIS — R79.89 LOW VITAMIN D LEVEL: ICD-10-CM

## 2023-05-03 DIAGNOSIS — F51.01 PRIMARY INSOMNIA: ICD-10-CM

## 2023-05-03 DIAGNOSIS — F33.0 MILD EPISODE OF RECURRENT MAJOR DEPRESSIVE DISORDER (HCC): ICD-10-CM

## 2023-05-03 LAB — 25(OH)D3 SERPL-MCNC: 40 NG/ML

## 2023-05-03 PROCEDURE — 99213 OFFICE O/P EST LOW 20 MIN: CPT | Performed by: NURSE PRACTITIONER

## 2023-05-03 RX ORDER — ERGOCALCIFEROL 1.25 MG/1
50000 CAPSULE ORAL WEEKLY
COMMUNITY
Start: 2023-04-15 | End: 2023-05-03 | Stop reason: ALTCHOICE

## 2023-05-03 RX ORDER — FLUOXETINE 10 MG/1
10 CAPSULE ORAL DAILY
Qty: 30 CAPSULE | Refills: 5 | Status: SHIPPED | OUTPATIENT
Start: 2023-05-03

## 2023-05-03 SDOH — ECONOMIC STABILITY: HOUSING INSECURITY
IN THE LAST 12 MONTHS, WAS THERE A TIME WHEN YOU DID NOT HAVE A STEADY PLACE TO SLEEP OR SLEPT IN A SHELTER (INCLUDING NOW)?: NO

## 2023-05-03 SDOH — ECONOMIC STABILITY: FOOD INSECURITY: WITHIN THE PAST 12 MONTHS, YOU WORRIED THAT YOUR FOOD WOULD RUN OUT BEFORE YOU GOT MONEY TO BUY MORE.: NEVER TRUE

## 2023-05-03 SDOH — ECONOMIC STABILITY: INCOME INSECURITY: HOW HARD IS IT FOR YOU TO PAY FOR THE VERY BASICS LIKE FOOD, HOUSING, MEDICAL CARE, AND HEATING?: NOT VERY HARD

## 2023-05-03 SDOH — ECONOMIC STABILITY: FOOD INSECURITY: WITHIN THE PAST 12 MONTHS, THE FOOD YOU BOUGHT JUST DIDN'T LAST AND YOU DIDN'T HAVE MONEY TO GET MORE.: NEVER TRUE

## 2023-05-03 ASSESSMENT — ENCOUNTER SYMPTOMS
NAUSEA: 0
VOMITING: 0
SORE THROAT: 0
RHINORRHEA: 0
SHORTNESS OF BREATH: 0
DIARRHEA: 0
COUGH: 0
ABDOMINAL PAIN: 0

## 2023-05-03 NOTE — TELEPHONE ENCOUNTER
----- Message from DON James sent at 5/3/2023  3:08 PM CDT -----  Vitamin D is normal  Okay to discontinue vitamin D prescription of 50,000 units weekly  Recommend vitamin D 2000 units daily. This can be gotten over-the-counter.

## 2023-05-03 NOTE — PROGRESS NOTES
Tympanic membrane, ear canal and external ear normal.      Left Ear: Tympanic membrane, ear canal and external ear normal.      Nose: Nose normal.   Eyes:      General:         Right eye: No discharge. Left eye: No discharge. Neck:      Vascular: No carotid bruit. Cardiovascular:      Rate and Rhythm: Normal rate and regular rhythm. Pulmonary:      Effort: Pulmonary effort is normal.      Breath sounds: Normal breath sounds. No wheezing, rhonchi or rales. Abdominal:      General: Bowel sounds are normal.      Palpations: Abdomen is soft. Musculoskeletal:         General: Normal range of motion. Cervical back: Normal range of motion. Right lower leg: No edema. Left lower leg: No edema. Skin:     General: Skin is dry. Neurological:      General: No focal deficit present. Mental Status: She is alert and oriented to person, place, and time. Mental status is at baseline. Psychiatric:         Mood and Affect: Mood normal.         Behavior: Behavior normal.         Thought Content: Thought content normal.         Judgment: Judgment normal.           An electronic signature was used to authenticate this note.     --DON Lynch

## 2023-09-26 DIAGNOSIS — Z91.89 LACK OF MOTIVATION: ICD-10-CM

## 2023-09-26 DIAGNOSIS — F43.9 STRESS: ICD-10-CM

## 2023-09-26 DIAGNOSIS — F33.0 MILD EPISODE OF RECURRENT MAJOR DEPRESSIVE DISORDER (HCC): ICD-10-CM

## 2023-09-26 RX ORDER — BUPROPION HYDROCHLORIDE 150 MG/1
150 TABLET ORAL EVERY MORNING
Qty: 30 TABLET | Refills: 5 | Status: SHIPPED | OUTPATIENT
Start: 2023-09-26

## 2023-09-26 NOTE — TELEPHONE ENCOUNTER
Received fax from pharmacy requesting refill on pts medication(s). Pt was last seen in office on 5/3/2023  and has a follow up scheduled for Visit date not found. Will send request to  [facility]  for authorization.      Requested Prescriptions     Pending Prescriptions Disp Refills    buPROPion (WELLBUTRIN XL) 150 MG extended release tablet [Pharmacy Med Name: buPROPion HCl ER (XL) 150 MG Oral Tablet Extended Release 24 Hour] 30 tablet 0     Sig: TAKE 1 TABLET BY MOUTH ONCE DAILY IN THE MORNING

## 2023-11-26 DIAGNOSIS — F33.0 MILD EPISODE OF RECURRENT MAJOR DEPRESSIVE DISORDER (HCC): ICD-10-CM

## 2023-11-26 DIAGNOSIS — F51.01 PRIMARY INSOMNIA: ICD-10-CM

## 2023-11-26 DIAGNOSIS — F41.1 GAD (GENERALIZED ANXIETY DISORDER): ICD-10-CM

## 2023-11-27 RX ORDER — FLUOXETINE 10 MG/1
10 CAPSULE ORAL DAILY
Qty: 90 CAPSULE | Refills: 2 | Status: SHIPPED | OUTPATIENT
Start: 2023-11-27

## 2023-11-27 NOTE — TELEPHONE ENCOUNTER
Quinton Aiken called requesting a refill of the below medication which has been pended for you:     Requested Prescriptions     Pending Prescriptions Disp Refills    FLUoxetine (PROZAC) 10 MG capsule [Pharmacy Med Name: FLUoxetine HCl 10 MG Oral Capsule] 30 capsule 0     Sig: Take 1 capsule by mouth once daily       Last Appointment Date: 5/3/2023  Next Appointment Date: Visit date not found    No Known Allergies

## 2024-04-10 ENCOUNTER — OFFICE VISIT (OUTPATIENT)
Dept: FAMILY MEDICINE CLINIC | Age: 29
End: 2024-04-10
Payer: COMMERCIAL

## 2024-04-10 VITALS
HEART RATE: 83 BPM | HEIGHT: 60 IN | DIASTOLIC BLOOD PRESSURE: 80 MMHG | OXYGEN SATURATION: 98 % | WEIGHT: 178.25 LBS | BODY MASS INDEX: 34.99 KG/M2 | TEMPERATURE: 97.3 F | SYSTOLIC BLOOD PRESSURE: 110 MMHG

## 2024-04-10 DIAGNOSIS — G44.209 TENSION HEADACHE: ICD-10-CM

## 2024-04-10 DIAGNOSIS — L50.9 URTICARIAL RASH: ICD-10-CM

## 2024-04-10 DIAGNOSIS — F43.9 STRESS: ICD-10-CM

## 2024-04-10 DIAGNOSIS — M54.2 CERVICAL PAIN: ICD-10-CM

## 2024-04-10 DIAGNOSIS — D22.9 NEVUS: ICD-10-CM

## 2024-04-10 DIAGNOSIS — L98.9 SKIN LESIONS: ICD-10-CM

## 2024-04-10 DIAGNOSIS — Z91.89 LACK OF MOTIVATION: ICD-10-CM

## 2024-04-10 DIAGNOSIS — Z00.00 ENCOUNTER FOR WELL ADULT EXAM WITHOUT ABNORMAL FINDINGS: Primary | ICD-10-CM

## 2024-04-10 DIAGNOSIS — F33.0 MILD EPISODE OF RECURRENT MAJOR DEPRESSIVE DISORDER (HCC): ICD-10-CM

## 2024-04-10 PROCEDURE — 99395 PREV VISIT EST AGE 18-39: CPT | Performed by: NURSE PRACTITIONER

## 2024-04-10 RX ORDER — TIZANIDINE 2 MG/1
2 TABLET ORAL NIGHTLY PRN
Qty: 30 TABLET | Refills: 1 | Status: SHIPPED | OUTPATIENT
Start: 2024-04-10

## 2024-04-10 RX ORDER — BUPROPION HYDROCHLORIDE 150 MG/1
150 TABLET ORAL EVERY MORNING
Qty: 30 TABLET | Refills: 5 | Status: SHIPPED | OUTPATIENT
Start: 2024-04-10

## 2024-04-10 RX ORDER — TRIAMCINOLONE ACETONIDE 1 MG/G
OINTMENT TOPICAL 2 TIMES DAILY
Qty: 80 G | Refills: 1 | Status: SHIPPED | OUTPATIENT
Start: 2024-04-10 | End: 2024-04-20

## 2024-04-10 RX ORDER — CETIRIZINE HYDROCHLORIDE 10 MG/1
10 TABLET ORAL DAILY
Qty: 30 TABLET | Refills: 3 | Status: SHIPPED | OUTPATIENT
Start: 2024-04-10

## 2024-04-10 RX ORDER — BUTALBITAL, ACETAMINOPHEN AND CAFFEINE 50; 325; 40 MG/1; MG/1; MG/1
1 TABLET ORAL EVERY 6 HOURS PRN
Qty: 40 TABLET | Refills: 0 | Status: SHIPPED | OUTPATIENT
Start: 2024-04-10

## 2024-04-10 ASSESSMENT — ENCOUNTER SYMPTOMS
SINUS PAIN: 0
DIARRHEA: 0
COUGH: 0
NAUSEA: 0
TROUBLE SWALLOWING: 0
VOICE CHANGE: 0
VOMITING: 0
SHORTNESS OF BREATH: 0
RHINORRHEA: 0
SINUS PRESSURE: 0
CONSTIPATION: 0

## 2024-04-10 ASSESSMENT — PATIENT HEALTH QUESTIONNAIRE - PHQ9
4. FEELING TIRED OR HAVING LITTLE ENERGY: NEARLY EVERY DAY
SUM OF ALL RESPONSES TO PHQ9 QUESTIONS 1 & 2: 4
8. MOVING OR SPEAKING SO SLOWLY THAT OTHER PEOPLE COULD HAVE NOTICED. OR THE OPPOSITE, BEING SO FIGETY OR RESTLESS THAT YOU HAVE BEEN MOVING AROUND A LOT MORE THAN USUAL: NEARLY EVERY DAY
9. THOUGHTS THAT YOU WOULD BE BETTER OFF DEAD, OR OF HURTING YOURSELF: NOT AT ALL
SUM OF ALL RESPONSES TO PHQ QUESTIONS 1-9: 21
6. FEELING BAD ABOUT YOURSELF - OR THAT YOU ARE A FAILURE OR HAVE LET YOURSELF OR YOUR FAMILY DOWN: NEARLY EVERY DAY
SUM OF ALL RESPONSES TO PHQ QUESTIONS 1-9: 21
SUM OF ALL RESPONSES TO PHQ QUESTIONS 1-9: 21
1. LITTLE INTEREST OR PLEASURE IN DOING THINGS: NEARLY EVERY DAY
5. POOR APPETITE OR OVEREATING: NEARLY EVERY DAY
SUM OF ALL RESPONSES TO PHQ QUESTIONS 1-9: 21
3. TROUBLE FALLING OR STAYING ASLEEP: NEARLY EVERY DAY
10. IF YOU CHECKED OFF ANY PROBLEMS, HOW DIFFICULT HAVE THESE PROBLEMS MADE IT FOR YOU TO DO YOUR WORK, TAKE CARE OF THINGS AT HOME, OR GET ALONG WITH OTHER PEOPLE: NOT DIFFICULT AT ALL
7. TROUBLE CONCENTRATING ON THINGS, SUCH AS READING THE NEWSPAPER OR WATCHING TELEVISION: MORE THAN HALF THE DAYS
2. FEELING DOWN, DEPRESSED OR HOPELESS: SEVERAL DAYS

## 2024-04-10 ASSESSMENT — COLUMBIA-SUICIDE SEVERITY RATING SCALE - C-SSRS
6. HAVE YOU EVER DONE ANYTHING, STARTED TO DO ANYTHING, OR PREPARED TO DO ANYTHING TO END YOUR LIFE?: NO
2. HAVE YOU ACTUALLY HAD ANY THOUGHTS OF KILLING YOURSELF?: NO
1. WITHIN THE PAST MONTH, HAVE YOU WISHED YOU WERE DEAD OR WISHED YOU COULD GO TO SLEEP AND NOT WAKE UP?: NO

## 2024-04-10 NOTE — PROGRESS NOTES
4/10/2024    Chelly Salas (:  1995) is a 28 y.o. female, here for a preventive medicine evaluation.    Eyes:  Last year . She is trying to schedule but having difficulty due to insurance.   Dentist: 2024  Skin:  Has several lesions she would like taken off. Would like referral to dermatology. She has rash of neck and folds of bilateral elbows  SBE:  Yes  Mammo:  No breast cancer screening on file  Pap:  Date of last Cervical Cancer screen (HPV or PAP): 2023  Menses: Yes last cycle end of March  Colonoscopy:  No colonoscopy on file   No cologuard on file  No FIT/FOBT on file   No flexible sigmoidoscopy on file  Dexa Scan: No  Calcium & Vit. D:  Takes Vitamin D but not calcium  MVI:  No  Supplements:  No  Asa:  No  Labs:  2/3/2023  Exercise: Does yoga   Body Image: Gained 30 lbs in a year  Diet and Nutr: No   Depression:  She is taking Wellbutrin 150 mg daily. Stopped prozac  Fall:  No  EOL:  No  Tobacco:  No. Stopped smoking 2 years ago  Substance:  No  Immunizations:  Does not want covid vaccine  Sleep Has mild insomnia. Will wake up 3-4 times a week at 1 am and cannot go back to sleep  Cognition: I forget a lot of stuff per my boss    HPI  SKIN  Has several lesions she would like taken off. Would like referral to dermatology. She has rash on her neck and folds of bilateral elbows  The rash on her neck has been present for the last two weeks.  It is very itchy.    MOOD  Stopped prozac due to cost  Maintained on Wellbutrin.   She has mild insomnia.     SWELLING  Patient is having swelling of hands and feet.   The issue began 2 weeks ago  Patient has pain in her hand joints  She will occasionally have wrist pain    HEADACHES  Patient is having headaches all day every day for the last 3 weeks  She will wake up in the morning with a headache  She is taking ibuprofen which helps but headache does not go away.    \"I take 8 tablets at a time.\"  Tylenol does not help.   Headache is in the

## 2024-04-11 ENCOUNTER — TELEPHONE (OUTPATIENT)
Dept: FAMILY MEDICINE CLINIC | Age: 29
End: 2024-04-11

## 2024-04-11 DIAGNOSIS — R74.8 ELEVATED LIVER ENZYMES: Primary | ICD-10-CM

## 2024-04-11 DIAGNOSIS — R71.8 RBC ABNORMALITY: ICD-10-CM

## 2024-04-11 DIAGNOSIS — Z00.00 ENCOUNTER FOR WELL ADULT EXAM WITHOUT ABNORMAL FINDINGS: ICD-10-CM

## 2024-04-11 LAB
ALBUMIN SERPL-MCNC: 4 G/DL (ref 3.5–5.2)
ALP SERPL-CCNC: 54 U/L (ref 35–104)
ALT SERPL-CCNC: 118 U/L (ref 5–33)
ANION GAP SERPL CALCULATED.3IONS-SCNC: 15 MMOL/L (ref 7–19)
AST SERPL-CCNC: 72 U/L (ref 5–32)
BASOPHILS # BLD: 0 K/UL (ref 0–0.2)
BASOPHILS NFR BLD: 0.9 % (ref 0–1)
BILIRUB SERPL-MCNC: 0.4 MG/DL (ref 0.2–1.2)
BUN SERPL-MCNC: 12 MG/DL (ref 6–20)
CALCIUM SERPL-MCNC: 8.6 MG/DL (ref 8.6–10)
CHLORIDE SERPL-SCNC: 104 MMOL/L (ref 98–111)
CHOLEST SERPL-MCNC: 191 MG/DL (ref 160–199)
CO2 SERPL-SCNC: 20 MMOL/L (ref 22–29)
CREAT SERPL-MCNC: 0.7 MG/DL (ref 0.5–0.9)
EOSINOPHIL # BLD: 0.1 K/UL (ref 0–0.6)
EOSINOPHIL NFR BLD: 3 % (ref 0–5)
ERYTHROCYTE [DISTWIDTH] IN BLOOD BY AUTOMATED COUNT: 11.5 % (ref 11.5–14.5)
GLUCOSE SERPL-MCNC: 91 MG/DL (ref 74–109)
HCT VFR BLD AUTO: 37.4 % (ref 37–47)
HDLC SERPL-MCNC: 48 MG/DL (ref 65–121)
HGB BLD-MCNC: 11.9 G/DL (ref 12–16)
IMM GRANULOCYTES # BLD: 0 K/UL
LDLC SERPL CALC-MCNC: 129 MG/DL
LYMPHOCYTES # BLD: 1.5 K/UL (ref 1.1–4.5)
LYMPHOCYTES NFR BLD: 33.3 % (ref 20–40)
MCH RBC QN AUTO: 32.3 PG (ref 27–31)
MCHC RBC AUTO-ENTMCNC: 31.8 G/DL (ref 33–37)
MCV RBC AUTO: 101.6 FL (ref 81–99)
MONOCYTES # BLD: 0.5 K/UL (ref 0–0.9)
MONOCYTES NFR BLD: 10.3 % (ref 0–10)
NEUTROPHILS # BLD: 2.3 K/UL (ref 1.5–7.5)
NEUTS SEG NFR BLD: 52 % (ref 50–65)
PLATELET # BLD AUTO: 297 K/UL (ref 130–400)
PMV BLD AUTO: 10.5 FL (ref 9.4–12.3)
POTASSIUM SERPL-SCNC: 4.5 MMOL/L (ref 3.5–5)
PROT SERPL-MCNC: 6.6 G/DL (ref 6.6–8.7)
RBC # BLD AUTO: 3.68 M/UL (ref 4.2–5.4)
SODIUM SERPL-SCNC: 139 MMOL/L (ref 136–145)
T4 FREE SERPL-MCNC: 1.1 NG/DL (ref 0.93–1.7)
TRIGL SERPL-MCNC: 69 MG/DL (ref 0–149)
TSH SERPL DL<=0.005 MIU/L-ACNC: 1.88 UIU/ML (ref 0.27–4.2)
WBC # BLD AUTO: 4.4 K/UL (ref 4.8–10.8)

## 2024-04-11 NOTE — TELEPHONE ENCOUNTER
----- Message from DON Foote sent at 4/11/2024  1:58 PM CDT -----  CMP: Normal sodium and potassium.  Blood glucose is normal at 91.  Normal kidney function.  Liver enzymes are elevated.  These have been normal in the past.  Recommend low-fat diet.  If patient drinks alcohol recommend decreasing.  Recommend rechecking LFTs in 1 month to ensure stability    Cholesterol is elevated.  Total cholesterol is 191.  However LDL is 129.  We recommend this be less than 100 and closer to 70 decrease your risk of heart attack and stroke.  Recommend the following lifestyle changes: Eat a variety of foods every day. Replace red meat with fish, poultry, and soy protein (like tofu). Limit processed and packaged foods like chips, crackers, and cookies. Bake, broil, or steam foods. Don't chase them. Limit foods high in cholesterol. These include egg yolks.    Be physically active. Get at least 30 minutes of exercise on most days of the week. Walking is a good choice. You also may want to do other activities, such as running, swimming, cycling, or playing tennis or team sports. Stay at a healthy weight or lose weight by making the changes in eating and physical activity listed above. Losing just a small amount of weight, even 5 to 10 pounds, can reduce your risk for having a heart attack or stroke.    CBC: White blood cell count, infection fighting cells essentially normal.  Hemoglobin hematocrit, oxygen-carrying cells are mildly low.  The size of your red blood cells are mildly enlarged.  Please see if we can add on a vitamin B12 and folate.    Thyroid is normal

## 2024-06-12 ENCOUNTER — TELEPHONE (OUTPATIENT)
Dept: FAMILY MEDICINE CLINIC | Age: 29
End: 2024-06-12

## 2024-06-12 ENCOUNTER — OFFICE VISIT (OUTPATIENT)
Dept: FAMILY MEDICINE CLINIC | Age: 29
End: 2024-06-12
Payer: COMMERCIAL

## 2024-06-12 VITALS
WEIGHT: 180 LBS | OXYGEN SATURATION: 98 % | BODY MASS INDEX: 35.34 KG/M2 | SYSTOLIC BLOOD PRESSURE: 126 MMHG | TEMPERATURE: 97.8 F | DIASTOLIC BLOOD PRESSURE: 80 MMHG | HEIGHT: 60 IN | HEART RATE: 111 BPM

## 2024-06-12 DIAGNOSIS — R74.8 ELEVATED LIVER ENZYMES: ICD-10-CM

## 2024-06-12 DIAGNOSIS — F51.01 PRIMARY INSOMNIA: Primary | ICD-10-CM

## 2024-06-12 DIAGNOSIS — G44.209 TENSION HEADACHE: ICD-10-CM

## 2024-06-12 DIAGNOSIS — F41.1 GAD (GENERALIZED ANXIETY DISORDER): ICD-10-CM

## 2024-06-12 DIAGNOSIS — F33.0 MILD EPISODE OF RECURRENT MAJOR DEPRESSIVE DISORDER (HCC): ICD-10-CM

## 2024-06-12 LAB
ALBUMIN SERPL-MCNC: 4.4 G/DL (ref 3.5–5.2)
ALP SERPL-CCNC: 57 U/L (ref 35–104)
ALT SERPL-CCNC: 23 U/L (ref 5–33)
AST SERPL-CCNC: 17 U/L (ref 5–32)
BILIRUB DIRECT SERPL-MCNC: 0.1 MG/DL (ref 0–0.3)
BILIRUB INDIRECT SERPL-MCNC: 0.3 MG/DL (ref 0.1–1)
BILIRUB SERPL-MCNC: 0.4 MG/DL (ref 0.2–1.2)
PROT SERPL-MCNC: 7.2 G/DL (ref 6.6–8.7)

## 2024-06-12 PROCEDURE — 99214 OFFICE O/P EST MOD 30 MIN: CPT | Performed by: NURSE PRACTITIONER

## 2024-06-12 RX ORDER — HYDROXYZINE HYDROCHLORIDE 25 MG/1
TABLET, FILM COATED ORAL
Qty: 60 TABLET | Refills: 3 | Status: SHIPPED | OUTPATIENT
Start: 2024-06-12

## 2024-06-12 SDOH — ECONOMIC STABILITY: FOOD INSECURITY: WITHIN THE PAST 12 MONTHS, YOU WORRIED THAT YOUR FOOD WOULD RUN OUT BEFORE YOU GOT MONEY TO BUY MORE.: NEVER TRUE

## 2024-06-12 SDOH — ECONOMIC STABILITY: FOOD INSECURITY: WITHIN THE PAST 12 MONTHS, THE FOOD YOU BOUGHT JUST DIDN'T LAST AND YOU DIDN'T HAVE MONEY TO GET MORE.: NEVER TRUE

## 2024-06-12 SDOH — ECONOMIC STABILITY: INCOME INSECURITY: HOW HARD IS IT FOR YOU TO PAY FOR THE VERY BASICS LIKE FOOD, HOUSING, MEDICAL CARE, AND HEATING?: NOT HARD AT ALL

## 2024-06-12 ASSESSMENT — ENCOUNTER SYMPTOMS
VOMITING: 0
SINUS PAIN: 0
COUGH: 0
RHINORRHEA: 0
VOICE CHANGE: 0
CONSTIPATION: 0
SINUS PRESSURE: 0
DIARRHEA: 0
NAUSEA: 0
SHORTNESS OF BREATH: 0
TROUBLE SWALLOWING: 0

## 2024-06-12 NOTE — PROGRESS NOTES
Chelly Salas (:  1995) is a 28 y.o. female,Established patient, here for evaluation of the following chief complaint(s):  Follow-up      ASSESSMENT/PLAN:    ICD-10-CM    1. Primary insomnia  F51.01 hydrOXYzine HCl (ATARAX) 25 MG tablet      2. Tension headache  G44.209 Continue Fioricet prn  Continue Zanaflex nightly prn.  Will treat insomnia first. She notes that lack of sleep contributes to her headaches.  If headaches continue despite better sleep, discussed possibly adding Inderal daily      3. Mild episode of recurrent major depressive disorder (HCC)  F33.0 Improved  Continue Wellbutrin 150 mg daily      4. EVONNE (generalized anxiety disorder)  F41.1 hydrOXYzine HCl (ATARAX) 25 MG tablet          Return in about 3 months (around 2024), or if symptoms worsen or fail to improve.    SUBJECTIVE/OBJECTIVE:  HPI    \"I had two moles removed earlier this week.\"    MOODS:  \"I feel pretty good.\"  \"I have read 5 books in the last month.\"  She is much more motivated.  She is tolerating the Wellbutrin 150 mg daily.  Moods are much improved.    TENSION HEADACHE:  Her trigger for her headaches is lack of sleep.  Fioricet sometimes helps with the headaches.   She is unsure if she needs refill today.   She will look when she gets home and let us know about refill.  Zanaflex sometimes help her headaches.  Headaches are no longer daily.  \"They are happening once or twice a week.\"  Headaches have lessened.  Denies light sensitivity  Denies nausea  \"When I get them I am really tired.\"    INSOMNIA:  \"I tried the sleep medication that you buy at [employer]. I just get angry so I quit taking it.\"  She has a hard time falling asleep and staying asleep.  \"It might help if I could get my child out of my bed.\"    /80   Pulse (!) 111   Temp 97.8 °F (36.6 °C) (Temporal)   Ht 1.524 m (5')   Wt 81.6 kg (180 lb)   LMP  (LMP Unknown)   SpO2 98%   BMI 35.15 kg/m²     Review of Systems   Constitutional:  Negative for

## 2024-06-12 NOTE — TELEPHONE ENCOUNTER
Pt aware and voiced understanding. Informed patient of any recommendations from providers. Will call with any further questions.

## 2024-06-12 NOTE — TELEPHONE ENCOUNTER
----- Message from DON Whitley sent at 6/12/2024  2:19 PM CDT -----  Please call patient and let them know results.   Normal liver function.  This has returned to normal

## 2024-06-12 NOTE — TELEPHONE ENCOUNTER
----- Message from DON Foote sent at 6/12/2024  2:29 PM CDT -----  Liver enzymes have returned to normal

## 2025-03-17 DIAGNOSIS — F51.01 PRIMARY INSOMNIA: ICD-10-CM

## 2025-03-17 DIAGNOSIS — F41.1 GAD (GENERALIZED ANXIETY DISORDER): ICD-10-CM

## 2025-03-18 RX ORDER — HYDROXYZINE HYDROCHLORIDE 25 MG/1
TABLET, FILM COATED ORAL
Qty: 60 TABLET | Refills: 3 | Status: SHIPPED | OUTPATIENT
Start: 2025-03-18

## 2025-03-18 NOTE — TELEPHONE ENCOUNTER
Received fax from pharmacy requesting refill on pts medication(s). Pt was last seen in office on 6/12/2024  and has a follow up scheduled for Visit date not found. Will send request to  [facility]  for authorization.     Requested Prescriptions     Pending Prescriptions Disp Refills    hydrOXYzine HCl (ATARAX) 25 MG tablet [Pharmacy Med Name: hydrOXYzine HCl 25 MG Oral Tablet] 60 tablet 3     Sig: TAKE 1 TO 2 TABLETS BY MOUTH NIGHTLY AS NEEDED FOR SLEEP

## 2025-04-23 DIAGNOSIS — M54.2 CERVICAL PAIN: ICD-10-CM

## 2025-04-24 RX ORDER — TIZANIDINE 2 MG/1
TABLET ORAL
Qty: 30 TABLET | Refills: 1 | Status: SHIPPED | OUTPATIENT
Start: 2025-04-24

## 2025-04-24 NOTE — TELEPHONE ENCOUNTER
Received fax from pharmacy requesting refill on pts medication(s). Pt was last seen in office on 06/12/2024  and has a follow up scheduled for Visit date not found. Will send request to  [facility]  for authorization.     Requested Prescriptions     Pending Prescriptions Disp Refills    tiZANidine (ZANAFLEX) 2 MG tablet [Pharmacy Med Name: tiZANidine HCl 2 MG Oral Tablet] 30 tablet 1     Sig: TAKE 1 TABLET BY MOUTH NIGHTLY AS NEEDED FOR  HEADACHE/CERVICAL  PAIN.

## 2025-06-27 ENCOUNTER — OFFICE VISIT (OUTPATIENT)
Age: 30
End: 2025-06-27

## 2025-06-27 VITALS
BODY MASS INDEX: 32.62 KG/M2 | HEART RATE: 67 BPM | SYSTOLIC BLOOD PRESSURE: 110 MMHG | HEIGHT: 60 IN | OXYGEN SATURATION: 99 % | WEIGHT: 166.13 LBS | DIASTOLIC BLOOD PRESSURE: 80 MMHG | TEMPERATURE: 96.9 F

## 2025-06-27 DIAGNOSIS — Z72.89 CURRENT EVERY DAY VAPING: ICD-10-CM

## 2025-06-27 DIAGNOSIS — R53.82 CHRONIC FATIGUE: ICD-10-CM

## 2025-06-27 DIAGNOSIS — Z91.89 LACK OF MOTIVATION: ICD-10-CM

## 2025-06-27 DIAGNOSIS — Z00.00 ENCOUNTER FOR WELL ADULT EXAM WITHOUT ABNORMAL FINDINGS: Primary | ICD-10-CM

## 2025-06-27 PROCEDURE — 99395 PREV VISIT EST AGE 18-39: CPT | Performed by: NURSE PRACTITIONER

## 2025-06-27 RX ORDER — BUPROPION HYDROCHLORIDE 150 MG/1
150 TABLET ORAL EVERY MORNING
Qty: 30 TABLET | Refills: 5 | Status: SHIPPED | OUTPATIENT
Start: 2025-06-27

## 2025-06-27 SDOH — ECONOMIC STABILITY: FOOD INSECURITY: WITHIN THE PAST 12 MONTHS, THE FOOD YOU BOUGHT JUST DIDN'T LAST AND YOU DIDN'T HAVE MONEY TO GET MORE.: NEVER TRUE

## 2025-06-27 SDOH — ECONOMIC STABILITY: FOOD INSECURITY: WITHIN THE PAST 12 MONTHS, YOU WORRIED THAT YOUR FOOD WOULD RUN OUT BEFORE YOU GOT MONEY TO BUY MORE.: NEVER TRUE

## 2025-06-27 ASSESSMENT — ENCOUNTER SYMPTOMS
ABDOMINAL PAIN: 0
NAUSEA: 0
CONSTIPATION: 0
SORE THROAT: 0
SHORTNESS OF BREATH: 0
DIARRHEA: 0
RHINORRHEA: 0
EYE REDNESS: 0
VOMITING: 0
COUGH: 0

## 2025-06-27 ASSESSMENT — PATIENT HEALTH QUESTIONNAIRE - PHQ9
SUM OF ALL RESPONSES TO PHQ QUESTIONS 1-9: 0
SUM OF ALL RESPONSES TO PHQ QUESTIONS 1-9: 0
1. LITTLE INTEREST OR PLEASURE IN DOING THINGS: NOT AT ALL
8. MOVING OR SPEAKING SO SLOWLY THAT OTHER PEOPLE COULD HAVE NOTICED. OR THE OPPOSITE, BEING SO FIGETY OR RESTLESS THAT YOU HAVE BEEN MOVING AROUND A LOT MORE THAN USUAL: NOT AT ALL
4. FEELING TIRED OR HAVING LITTLE ENERGY: NOT AT ALL
SUM OF ALL RESPONSES TO PHQ QUESTIONS 1-9: 0
3. TROUBLE FALLING OR STAYING ASLEEP: NOT AT ALL
6. FEELING BAD ABOUT YOURSELF - OR THAT YOU ARE A FAILURE OR HAVE LET YOURSELF OR YOUR FAMILY DOWN: NOT AT ALL
7. TROUBLE CONCENTRATING ON THINGS, SUCH AS READING THE NEWSPAPER OR WATCHING TELEVISION: NOT AT ALL
9. THOUGHTS THAT YOU WOULD BE BETTER OFF DEAD, OR OF HURTING YOURSELF: NOT AT ALL
10. IF YOU CHECKED OFF ANY PROBLEMS, HOW DIFFICULT HAVE THESE PROBLEMS MADE IT FOR YOU TO DO YOUR WORK, TAKE CARE OF THINGS AT HOME, OR GET ALONG WITH OTHER PEOPLE: NOT DIFFICULT AT ALL
5. POOR APPETITE OR OVEREATING: NOT AT ALL
SUM OF ALL RESPONSES TO PHQ QUESTIONS 1-9: 0
2. FEELING DOWN, DEPRESSED OR HOPELESS: NOT AT ALL

## 2025-06-27 NOTE — PROGRESS NOTES
Well Adult Note  Name: Chelly Salas Today’s Date: 2025   MRN: [id number] Sex: Female   Age: 29 y.o. Ethnicity: Non- / Non    : 1995 Race: White (non-)      Chelly Salas is here for a well adult exam.          Assessment & Plan  1. Annual physical examination.  - Weight loss of approximately 14 pounds since the previous summer.  - A comprehensive set of laboratory tests will be ordered, including B12, vitamin D, blood counts, kidney function, liver function, thyroid function, and cholesterol levels.  - Advised to fast for 8 to 10 hours prior to these tests, with only water and black coffee permitted during this period.  - A Pap smear will be scheduled at her convenience.    2. Chronic fatigue: Chronic.  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  - Possibility of deficiencies in thyroid function, B12, or anemia will be explored through laboratory tests.  - If lab results are normal, consider resuming daily Wellbutrin intake to help with motivation and fatigue.  - Chronic fatigue will be monitored with the results of the upcoming lab tests.    3. Menstrual cramps: Acute.  - Bilateral nature of cramps suggests a low likelihood of a cyst.  - Sudden onset of severe menstrual cramps is unexplained.  - Advised to take over-the-counter pain relievers such as ibuprofen, Aleve, or Midol if cramps recur to determine if they alleviate the pain.  - Monitoring for any changes or patterns in menstrual cramps will continue.    4. Nicotine dependence.  - Potential use of Chantix was discussed, including its benefits and side effects such as nightmares and nausea.  - Encouraged to continue efforts to quit vaping and to follow up if additional support is needed.  - Monitoring progress with smoking cessation strategies.    Follow-up  - Pap smear to be scheduled at her convenience.  - Laboratory tests to be conducted

## 2025-06-30 ENCOUNTER — RESULTS FOLLOW-UP (OUTPATIENT)
Age: 30
End: 2025-06-30

## 2025-06-30 DIAGNOSIS — Z00.00 ENCOUNTER FOR WELL ADULT EXAM WITHOUT ABNORMAL FINDINGS: ICD-10-CM

## 2025-06-30 DIAGNOSIS — D64.9 ANEMIA, UNSPECIFIED TYPE: ICD-10-CM

## 2025-06-30 DIAGNOSIS — E55.9 VITAMIN D DEFICIENCY: Primary | ICD-10-CM

## 2025-06-30 LAB
25(OH)D3 SERPL-MCNC: 18.4 NG/ML
ALBUMIN SERPL-MCNC: 4.3 G/DL (ref 3.5–5.2)
ALP SERPL-CCNC: 54 U/L (ref 35–104)
ALT SERPL-CCNC: 13 U/L (ref 10–35)
ANION GAP SERPL CALCULATED.3IONS-SCNC: 10 MMOL/L (ref 8–16)
AST SERPL-CCNC: 16 U/L (ref 10–35)
BASOPHILS # BLD: 0 K/UL (ref 0–0.2)
BASOPHILS NFR BLD: 0.8 % (ref 0–1)
BILIRUB SERPL-MCNC: 0.3 MG/DL (ref 0.2–1.2)
BUN SERPL-MCNC: 12 MG/DL (ref 6–20)
CALCIUM SERPL-MCNC: 9.4 MG/DL (ref 8.6–10)
CHLORIDE SERPL-SCNC: 105 MMOL/L (ref 98–107)
CHOLEST SERPL-MCNC: 164 MG/DL (ref 0–199)
CO2 SERPL-SCNC: 25 MMOL/L (ref 22–29)
CREAT SERPL-MCNC: 0.8 MG/DL (ref 0.5–0.9)
EOSINOPHIL # BLD: 0.1 K/UL (ref 0–0.6)
EOSINOPHIL NFR BLD: 2.4 % (ref 0–5)
ERYTHROCYTE [DISTWIDTH] IN BLOOD BY AUTOMATED COUNT: 11.5 % (ref 11.5–14.5)
GLUCOSE SERPL-MCNC: 96 MG/DL (ref 70–99)
HCT VFR BLD AUTO: 35.9 % (ref 37–47)
HDLC SERPL-MCNC: 44 MG/DL (ref 40–60)
HGB BLD-MCNC: 11.7 G/DL (ref 12–16)
IMM GRANULOCYTES # BLD: 0 K/UL
LDLC SERPL CALC-MCNC: 107 MG/DL
LYMPHOCYTES # BLD: 2 K/UL (ref 1.1–4.5)
LYMPHOCYTES NFR BLD: 41.3 % (ref 20–40)
MCH RBC QN AUTO: 31.8 PG (ref 27–31)
MCHC RBC AUTO-ENTMCNC: 32.6 G/DL (ref 33–37)
MCV RBC AUTO: 97.6 FL (ref 81–99)
MONOCYTES # BLD: 0.5 K/UL (ref 0–0.9)
MONOCYTES NFR BLD: 10.2 % (ref 0–10)
NEUTROPHILS # BLD: 2.2 K/UL (ref 1.5–7.5)
NEUTS SEG NFR BLD: 45.1 % (ref 50–65)
PLATELET # BLD AUTO: 305 K/UL (ref 130–400)
PMV BLD AUTO: 10.7 FL (ref 9.4–12.3)
POTASSIUM SERPL-SCNC: 4.4 MMOL/L (ref 3.5–5.1)
PROT SERPL-MCNC: 6.6 G/DL (ref 6.4–8.3)
RBC # BLD AUTO: 3.68 M/UL (ref 4.2–5.4)
SODIUM SERPL-SCNC: 140 MMOL/L (ref 136–145)
T4 FREE SERPL-MCNC: 1.26 NG/DL (ref 0.93–1.7)
TRIGL SERPL-MCNC: 63 MG/DL (ref 0–149)
TSH SERPL DL<=0.005 MIU/L-ACNC: 2.45 UIU/ML (ref 0.27–4.2)
VIT B12 SERPL-MCNC: 622 PG/ML (ref 232–1245)
WBC # BLD AUTO: 4.9 K/UL (ref 4.8–10.8)

## 2025-07-01 DIAGNOSIS — D64.9 ANEMIA, UNSPECIFIED TYPE: ICD-10-CM

## 2025-07-01 LAB
BILIRUB DIRECT SERPL-MCNC: <0.1 MG/DL (ref 0–0.3)
BILIRUB INDIRECT SERPL-MCNC: 0.2 MG/DL (ref 0–1)
BILIRUB SERPL-MCNC: 0.3 MG/DL (ref 0.2–1.2)
FERRITIN SERPL-MCNC: 40.3 NG/ML (ref 13–150)
HAPTOGLOB SERPL-MCNC: 84.1 MG/DL (ref 30–200)
HCT VFR BLD AUTO: 34.8 % (ref 37–47)
IRON SATN MFR SERPL: 22 % (ref 15–50)
IRON SERPL-MCNC: 65 UG/DL (ref 37–145)
LDH SERPL-CCNC: 160 U/L (ref 135–214)
RETICS # AUTO: 0.06 M/UL (ref 0.03–0.12)
RETICS/RBC NFR: 1.62 % (ref 0.5–1.5)
TIBC SERPL-MCNC: 302 UG/DL (ref 250–400)

## 2025-07-01 RX ORDER — ERGOCALCIFEROL 1.25 MG/1
50000 CAPSULE, LIQUID FILLED ORAL WEEKLY
Qty: 4 CAPSULE | Refills: 0 | Status: SHIPPED | OUTPATIENT
Start: 2025-07-01

## 2025-07-02 ENCOUNTER — RESULTS FOLLOW-UP (OUTPATIENT)
Age: 30
End: 2025-07-02

## 2025-07-10 ENCOUNTER — OFFICE VISIT (OUTPATIENT)
Age: 30
End: 2025-07-10
Payer: COMMERCIAL

## 2025-07-10 VITALS
OXYGEN SATURATION: 96 % | DIASTOLIC BLOOD PRESSURE: 70 MMHG | WEIGHT: 163.38 LBS | BODY MASS INDEX: 32.08 KG/M2 | TEMPERATURE: 97.3 F | HEIGHT: 60 IN | HEART RATE: 65 BPM | SYSTOLIC BLOOD PRESSURE: 100 MMHG

## 2025-07-10 DIAGNOSIS — N89.8 VAGINAL DISCHARGE: ICD-10-CM

## 2025-07-10 DIAGNOSIS — Z12.4 PAPANICOLAOU SMEAR: Primary | ICD-10-CM

## 2025-07-10 DIAGNOSIS — R47.89 WORD FINDING DIFFICULTY: ICD-10-CM

## 2025-07-10 DIAGNOSIS — B35.1 ONYCHOMYCOSIS OF LEFT GREAT TOE: ICD-10-CM

## 2025-07-10 DIAGNOSIS — Z80.41 FAMILY HISTORY OF OVARIAN CANCER: ICD-10-CM

## 2025-07-10 LAB
Lab: NORMAL
REPORT: NORMAL
THIS TEST SENT TO: NORMAL

## 2025-07-10 PROCEDURE — 99214 OFFICE O/P EST MOD 30 MIN: CPT | Performed by: NURSE PRACTITIONER

## 2025-07-10 RX ORDER — MULTIVIT-MIN/IRON/FOLIC ACID/K 18-600-40
2000 CAPSULE ORAL DAILY
COMMUNITY

## 2025-07-10 RX ORDER — TERBINAFINE HYDROCHLORIDE 250 MG/1
250 TABLET ORAL DAILY
Qty: 84 TABLET | Refills: 0 | Status: SHIPPED | OUTPATIENT
Start: 2025-07-10 | End: 2025-10-02

## 2025-07-10 RX ORDER — MULTIVIT-MIN/FERROUS GLUCONATE 9 MG/15 ML
15 LIQUID (ML) ORAL DAILY
COMMUNITY

## 2025-07-10 SDOH — ECONOMIC STABILITY: FOOD INSECURITY: WITHIN THE PAST 12 MONTHS, YOU WORRIED THAT YOUR FOOD WOULD RUN OUT BEFORE YOU GOT MONEY TO BUY MORE.: NEVER TRUE

## 2025-07-10 SDOH — ECONOMIC STABILITY: FOOD INSECURITY: WITHIN THE PAST 12 MONTHS, THE FOOD YOU BOUGHT JUST DIDN'T LAST AND YOU DIDN'T HAVE MONEY TO GET MORE.: NEVER TRUE

## 2025-07-10 ASSESSMENT — ENCOUNTER SYMPTOMS
ABDOMINAL PAIN: 0
RHINORRHEA: 0
COUGH: 0
DIARRHEA: 0

## 2025-07-10 ASSESSMENT — PATIENT HEALTH QUESTIONNAIRE - PHQ9
5. POOR APPETITE OR OVEREATING: NOT AT ALL
4. FEELING TIRED OR HAVING LITTLE ENERGY: MORE THAN HALF THE DAYS
6. FEELING BAD ABOUT YOURSELF - OR THAT YOU ARE A FAILURE OR HAVE LET YOURSELF OR YOUR FAMILY DOWN: NOT AT ALL
SUM OF ALL RESPONSES TO PHQ QUESTIONS 1-9: 2
9. THOUGHTS THAT YOU WOULD BE BETTER OFF DEAD, OR OF HURTING YOURSELF: NOT AT ALL
10. IF YOU CHECKED OFF ANY PROBLEMS, HOW DIFFICULT HAVE THESE PROBLEMS MADE IT FOR YOU TO DO YOUR WORK, TAKE CARE OF THINGS AT HOME, OR GET ALONG WITH OTHER PEOPLE: NOT DIFFICULT AT ALL
SUM OF ALL RESPONSES TO PHQ QUESTIONS 1-9: 2
2. FEELING DOWN, DEPRESSED OR HOPELESS: NOT AT ALL
8. MOVING OR SPEAKING SO SLOWLY THAT OTHER PEOPLE COULD HAVE NOTICED. OR THE OPPOSITE, BEING SO FIGETY OR RESTLESS THAT YOU HAVE BEEN MOVING AROUND A LOT MORE THAN USUAL: NOT AT ALL
SUM OF ALL RESPONSES TO PHQ QUESTIONS 1-9: 2
7. TROUBLE CONCENTRATING ON THINGS, SUCH AS READING THE NEWSPAPER OR WATCHING TELEVISION: NOT AT ALL
3. TROUBLE FALLING OR STAYING ASLEEP: NOT AT ALL
SUM OF ALL RESPONSES TO PHQ QUESTIONS 1-9: 2
1. LITTLE INTEREST OR PLEASURE IN DOING THINGS: NOT AT ALL

## 2025-07-10 NOTE — PROGRESS NOTES
Chelly Salas (:  1995) is a 30 y.o. female, Established patient, here for evaluation of the following chief complaint(s):  Gynecologic Exam (Routine pap), Vaginal Discharge, Nail Problem (toenail), and speech issue (\"I call things the wrong name\" patient reports having a hard time finding the words she needs)     Assessment & Plan  1. Vaginal discharge: Acute.  - A swab will be taken during the Pap smear to check for infections requiring antibiotic or antifungal treatment.  - A nonobstetric transvaginal ultrasound will be ordered due to family history of ovarian cancer to ensure normal ovarian appearance.  - The swab and Pap smear will be sent for analysis. If abnormalities are detected, especially considering the history of HPV and prior LEEP procedure, a referral to gynecology will be made if necessary.    2. Onychomycosis: Chronic.  - Left great toe shows signs of fungal infection. Liver enzymes are normal.  - Lamisil (terbinafine) 250 mg will be prescribed, to be taken once daily for 12 weeks.  - It was explained that resolution may take 6 to 12 months. If the infection persists, a referral to podiatry may be necessary for further treatment.    3. Word-finding difficulties.  - Increased frequency of word-finding issues was reported.  - Advised to keep the mind active through reading, word searches, and crossword puzzles.  - If other memory issues develop, a referral to neurology will be considered.    Follow-up  - Pap smear results will be reviewed, and further action will be taken if abnormalities are detected.    Results  Labs   - Liver enzymes: Normal      ICD-10-CM    1. Papanicolaou smear  Z12.4 PAP SMEAR      2. Vaginal discharge  N89.8 Vaginitis Panel PCR      3. Onychomycosis of left great toe  B35.1 terbinafine (LAMISIL) 250 MG tablet      4. Word finding difficulty  R47.89 Keep mind active      5. Family history of ovarian cancer  Z80.41 US NON OB TRANSVAGINAL        Return if symptoms

## 2025-07-11 LAB — HPV16+18+H RISK 12 DNA SPEC-IMP: NORMAL

## 2025-07-12 LAB — MISCELLANEOUS LAB TEST ORDER: NORMAL

## 2025-07-15 LAB
HPV HR 12 DNA SPEC QL NAA+PROBE: NOT DETECTED
HPV16 DNA SPEC QL NAA+PROBE: NOT DETECTED
HPV16+18+H RISK 12 DNA SPEC-IMP: NORMAL
HPV18 DNA SPEC QL NAA+PROBE: NOT DETECTED

## (undated) DEVICE — GOWN,PREVENTION PLUS,XLN/XL,ST,24/CS: Brand: MEDLINE

## (undated) DEVICE — SUTURE PERMAHAND SZ 3-0 L18IN NONABSORBABLE BLK L26MM SH C013D

## (undated) DEVICE — GLOVE SURG SZ 75 CRM LTX FREE POLYISOPRENE POLYMER BEAD ANTI

## (undated) DEVICE — CAUTERY TIP POLISHER: Brand: DEVON

## (undated) DEVICE — DUAL LUMEN STOMACH TUBE: Brand: SALEM SUMP

## (undated) DEVICE — SURGICAL PROCEDURE PACK GYNECOLOGIC MIN LOURDES HOSP

## (undated) DEVICE — ELECTRODE ES L11CM L15XW20MM BLU SAFE-T-GAUGE LOOP RND

## (undated) DEVICE — ELECTRODE ES L11CM DIA5MM BALL SHFT RED DISP UTAHLOOP

## (undated) DEVICE — SOLUTION IV IRRIG POUR BRL 0.9% SODIUM CHL 2F7124